# Patient Record
Sex: FEMALE | Race: WHITE | NOT HISPANIC OR LATINO | Employment: UNEMPLOYED | URBAN - METROPOLITAN AREA
[De-identification: names, ages, dates, MRNs, and addresses within clinical notes are randomized per-mention and may not be internally consistent; named-entity substitution may affect disease eponyms.]

---

## 2021-06-07 ENCOUNTER — OFFICE VISIT (OUTPATIENT)
Dept: GYNECOLOGIC ONCOLOGY | Facility: CLINIC | Age: 56
End: 2021-06-07

## 2021-06-07 VITALS
WEIGHT: 127.5 LBS | DIASTOLIC BLOOD PRESSURE: 67 MMHG | BODY MASS INDEX: 21.77 KG/M2 | TEMPERATURE: 96.4 F | SYSTOLIC BLOOD PRESSURE: 147 MMHG | HEIGHT: 64 IN | RESPIRATION RATE: 16 BRPM | HEART RATE: 83 BPM

## 2021-06-07 DIAGNOSIS — K64.9 HEMORRHOIDS, UNSPECIFIED HEMORRHOID TYPE: ICD-10-CM

## 2021-06-07 DIAGNOSIS — R19.00 PELVIC MASS: Primary | ICD-10-CM

## 2021-06-07 PROCEDURE — 99245 OFF/OP CONSLTJ NEW/EST HI 55: CPT | Performed by: OBSTETRICS & GYNECOLOGY

## 2021-06-07 RX ORDER — ACETAMINOPHEN 325 MG/1
975 TABLET ORAL ONCE
Status: CANCELLED | OUTPATIENT
Start: 2021-06-07 | End: 2021-06-07

## 2021-06-07 RX ORDER — HEPARIN SODIUM 5000 [USP'U]/ML
5000 INJECTION, SOLUTION INTRAVENOUS; SUBCUTANEOUS ONCE
Status: CANCELLED | OUTPATIENT
Start: 2021-06-07 | End: 2021-06-07

## 2021-06-07 NOTE — PATIENT INSTRUCTIONS
Abdominal Hysterectomy, Care After  This sheet gives you information about how to care for yourself after your procedure. Your doctor may also give you more specific instructions. If you have problems or questions, contact your doctor.  What can I expect after the procedure?  After the procedure, it is common to have:  · Pain.  · Tiredness.  · No desire to eat.  · Less interest in sex.  · Bleeding and fluid (discharge) from your vagina. You may need to use a pad after this procedure.  Follow these instructions at home:  Medicines  · Take over-the-counter and prescription medicines only as told by your doctor.  · Do not take aspirin or NSAIDs, such as ibuprofen. These medicines can cause bleeding.  · If you were prescribed an antibiotic medicine, take it as told by your doctor. Do not stop using the antibiotic even if you start to feel better.  · Ask your doctor if the medicine prescribed to you:  ? Requires you to avoid driving or using machinery.  ? Can cause trouble pooping (constipation). You may need to take these actions to prevent or treat trouble pooping:  § Take over-the-counter or prescription medicines.  § Eat foods that are high in fiber. These include beans, whole grains, and fresh fruits and vegetables.  § Limit foods that are high in fat and sugar. These include fried or sweet foods.  Surgical cut care    · Follow instructions from your doctor about how to take care of your cut from surgery (incision). Make sure you:  ? Wash your hands with soap and water for at least 20 seconds before and after you change your bandage (dressing). If you cannot use soap and water, use hand .  ? Change your bandage as told by your doctor.  ? Leave stitches (sutures), skin glue, or skin tape (adhesive) strips in place. They may need to stay in place for 2 weeks or longer. If tape strips get loose and curl up, you may trim the loose edges. Do not remove tape strips completely unless your doctor says it is  okay.  · Keep the bandage dry until your doctor says it can be taken off.  · Check your cut from surgery every day for signs of infection. Check for:  ? Redness, swelling, or pain.  ? Fluid or blood.  ? Warmth.  ? Pus or a bad smell.  Activity    · Rest as told by your doctor.  · Do not sit for a long time without moving. Get up to take short walks every 1-2 hours. This is important. Ask for help if you feel weak or unsteady.  · Do not lift anything that is heavier than 10 lb (4.5 kg), or the limit that you are told, until your doctor says that it is safe.  · Follow your doctor's advice about exercise, driving, and general activities. Return to your normal activities as told by your doctor. Ask your doctor what activities are safe for you.  Lifestyle  · Do not douche, use tampons, or have sex for at least 6 weeks or as told by your doctor.  · Do not drink alcohol until your doctor says it is okay.  · Do not use any products that contain nicotine or tobacco, such as cigarettes, e-cigarettes, and chewing tobacco. These can delay healing. If you need help quitting, ask your doctor.  General instructions  · Drink enough fluid to keep your pee (urine) pale yellow.  · Do not take baths, swim, or use a hot tub until your doctor approves. Ask your doctor if you may take showers. You may only be allowed to take sponge baths.  · Try to have someone at home with you for the first 1-2 weeks to help with your daily chores.  · Wear tight-fitting (compression) stockings as told by your doctor.  · Keep all follow-up visits as told by your doctor. This is important.  Contact a doctor if:  · You have chills or a fever.  · You have any of these signs of infection around your cut:  ? Redness, swelling, or pain.  ? Fluid or blood.  ? Warmth.  ? Pus or a bad smell.  · Your cut breaks open.  · You feel dizzy or light-headed.  · You have pain or bleeding when you pee.  · You keep having watery poop (diarrhea).  · You keep feeling like you  may vomit (nauseous) or you keep vomiting.  · You have fluid coming from your vagina that is not normal.  · You have any type of reaction to your medicine that is not normal, like a rash, or you develop an allergy to your medicine.  · Your pain medicine does not help.  Get help right away if:  · You have a fever and your symptoms get worse all of a sudden.  · You have very bad pain in your belly (abdomen).  · You are short of breath.  · You faint.  · You have pain, swelling, or redness of your leg.  · You bleed a lot from your vagina and you see blood clots.  Summary  · It is normal to have some pain, tiredness, and fluid that comes from your vagina.  · Do not take baths, swim, or use a hot tub until your doctor approves. Ask your doctor if you may take showers. You may only be allowed to take sponge baths.  · Do not lift anything that is heavier than 10 lb (4.5 kg), or the limit that you are told, until your doctor says that it is safe.  · Follow your doctor's advice about exercise, driving, and general activities. Ask your doctor what activities are safe for you.  · Try to have someone at home with you for the first 1-2 weeks to help with your daily chores.  This information is not intended to replace advice given to you by your health care provider. Make sure you discuss any questions you have with your health care provider.  Document Revised: 12/15/2020 Document Reviewed: 12/15/2020  [employer] Patient Education © 2021 [employer]

## 2021-06-07 NOTE — PROGRESS NOTES
"     New Patient Office Visit      Patient Name: Maryann Bettencourt  : 1965   MRN: [id number]     Referring Physician: Nemesio Cid MD    Chief Complaint:    Chief Complaint   Patient presents with   • Mass       History of Present Illness: Maryann Bettencourt is a 56 y.o. female who is here today as a consultation for gynecologic oncology.  She reports 6 to 9 months of a feeling of pressure or \"laying on something\" when lying on her stomach to get massages.  She also notes some right lower back pain but she is unsure if this is related.  She has recently lost about 15 pounds, but this was desired and with much effort by cutting out potatoes, bread, and pasta.  She denies any bowel or bladder dysfunction.  She does have some nocturia 2-3 times a night, but this is stable for several years.  She is able to feel the mass through her belly.  She is otherwise well with no past medical history.  She is sexually active and has concerns about impact of hysterectomy on sexual function.  Reports restless leg.    Problem   Pelvic Mass    Referred by Dr. Nemesio Cid    2021: TVUS with uterus is \"severely enlarged \".  There is extensive shadowing within the myometrium.  There is a large solid mass in the right adnexa measuring 13 x 8 x 9 cm with an internal cystic area and a second mass projecting to the left of midline measuring 6 x 6 x 5 cm.  They cannot visualize the endometrium.           Past Medical History:   Past Medical History:   Diagnosis Date   • Pelvic mass        Past Surgical History:   Past Surgical History:   Procedure Laterality Date   • ENDOMETRIAL ABLATION         Family History:   Family History   Problem Relation Age of Onset   • Coronary artery disease Father    • Kidney cancer Father    • Diabetes Father    • Hypertension Father    • Stroke Father    • Lung cancer Mother    • Brain cancer Mother    • Hypertension Sister    • Arthritis Paternal Grandmother    • Breast cancer Neg Hx    • Ovarian cancer " Neg Hx    • Uterine cancer Neg Hx    • Colon cancer Neg Hx    • Melanoma Neg Hx    • Prostate cancer Neg Hx        Social History:   Social History     Socioeconomic History   • Marital status:      Spouse name: Not on file   • Number of children: Not on file   • Years of education: Not on file   • Highest education level: Not on file   Tobacco Use   • Smoking status: Former Smoker     Years: 30.00     Quit date:      Years since quittin.4   • Smokeless tobacco: Never Used   Substance and Sexual Activity   • Alcohol use: Yes   • Drug use: Never   • Sexual activity: Yes     Partners: Male     Birth control/protection: Post-menopausal       Past OB/GYN History:   OB History   No obstetric history on file.        Health Maintenance:   Mammogram: Date: 2021  Colonoscopy: Date:  results: Normal    Medications:     Current Outpatient Medications:   •  Loratadine (CLARITIN PO), Take  by mouth., Disp: , Rfl:     Allergies:   No Known Allergies    Review of Systems:   Review of Systems   Constitutional: Negative for appetite change, chills, fatigue, fever and unexpected weight change.   HENT: Negative for congestion, hearing loss, sneezing, sore throat and tinnitus.    Eyes: Negative for visual disturbance.   Respiratory: Negative for cough, shortness of breath and wheezing.    Cardiovascular: Negative for chest pain, palpitations and leg swelling.   Gastrointestinal: Positive for abdominal distention. Negative for abdominal pain, constipation, diarrhea, nausea and vomiting.   Genitourinary: Positive for frequency (At night). Negative for dyspareunia, dysuria, hematuria, pelvic pain, urgency and vaginal bleeding.   Musculoskeletal: Negative for arthralgias, back pain and myalgias.   Skin: Negative for color change, pallor and rash.   Neurological: Negative for dizziness, light-headedness, numbness and headaches.   Hematological: Negative for adenopathy. Does not bruise/bleed easily.  "  Psychiatric/Behavioral: Negative for dysphoric mood. The patient is not nervous/anxious.         Objective     Physical Exam:  Vital Signs:   Vitals:    06/07/21 1339   BP: 147/67   Pulse: 83   Resp: 16   Temp: 96.4 °F (35.8 °C)   TempSrc: Temporal   Weight: 57.8 kg (127 lb 8 oz)   Height: 162.6 cm (64\")   PainSc: 0-No pain     BMI: Body mass index is 21.89 kg/m².   ECOG PS: 0              PHQ-2 Depression Screening  Little interest or pleasure in doing things? 0   Feeling down, depressed, or hopeless? 0   PHQ-2 Total Score 0     Physical Exam  Vitals and nursing note reviewed. Exam conducted with a chaperone present.   Constitutional:       General: She is not in acute distress.     Appearance: Normal appearance. She is normal weight. She is not ill-appearing or toxic-appearing.   HENT:      Head: Normocephalic.      Nose: Nose normal.      Mouth/Throat:      Mouth: Mucous membranes are dry.   Eyes:      Extraocular Movements: Extraocular movements intact.      Pupils: Pupils are equal, round, and reactive to light.   Cardiovascular:      Rate and Rhythm: Normal rate and regular rhythm.      Heart sounds: No murmur heard.   No friction rub. No gallop.    Pulmonary:      Effort: Pulmonary effort is normal. No respiratory distress.      Breath sounds: Normal breath sounds. No wheezing, rhonchi or rales.   Abdominal:      General: Abdomen is flat. There is no distension.      Palpations: There is mass (There is a firm, mobile right-sided abdominal mass to the level of the umbilicus).      Tenderness: There is no abdominal tenderness. There is no guarding or rebound.   Genitourinary:     Comments: External genitalia normal.  Vagina without discharge.  Cervix is deviated to patient's left.  Irregularly shaped mass approximately 20 weeks in size.  Difficult to distinguish the adnexa from the uterus.  Rectovaginal exam deferred  Musculoskeletal:         General: Normal range of motion.      Cervical back: Normal range " of motion.   Skin:     General: Skin is warm and dry.   Neurological:      General: No focal deficit present.      Mental Status: She is alert and oriented to person, place, and time. Mental status is at baseline.      Cranial Nerves: No cranial nerve deficit.      Motor: No weakness.   Psychiatric:         Mood and Affect: Mood normal.         Behavior: Behavior normal.         Thought Content: Thought content normal.         Judgment: Judgment normal.         Assessment / Plan    Maryann Bettencourt is a 56 y.o. who presents two large symptomatic solid pelvic masses. Images visualized by me and demonstrate findings most consistent with uterine fibroids..  Unfortunately there is no way to determine whether or not this is malignant or benign without surgical intervention (though I favor benign). I recommend proceeding with total abdominal hysterectomy, bilateral salpingo-oophorectomy via abdominal approach. We discussed the risks of surgery including bleeding, infection, wound breakdown, blood clots, injury to surrounding organs including bowel, bladder, ureters, blood vessels and nerves requiring additional intervention. She verbally consented and will be scheduled for surgery in the near future.  We did review her recovery next dictations for surgery including anticipated hospital stay.  We discussed multimodality pain control with the intention to avoid narcotics.  Patient has a history of constipation with hemorrhoids be sure that we keep her on a good bowel regimen.    Pain assessment was performed today as a part of patient’s care.  For patients with pain related to surgery, gynecologic malignancy or cancer treatment, the plan is as noted in the assessment/plan.  For patients with pain not related to these issues, they are to seek any further needed care from a more appropriate provider, such as PCP.      Depression assessment was performed as indicated. For patients with prior diagnoses of anxiety/depression and  currently on medications, they were encouraged to seek any further needed care from their PCP or mental health professional. For those patients without a prior diagnosis and concern for depression, the plan is as noted in the assessment/plan.     Diagnoses and all orders for this visit:    1. Pelvic mass (Primary)  -     Case Request; Standing  -     CBC & Differential; Future  -     Comprehensive Metabolic Panel; Future  -     Type & Screen; Future  -     ECG 12 Lead; Future  -     Case Request  -     Liquid-based Pap Smear, Diagnostic; Future    Other orders  -     Verify NPO Status; Future  -     Obtain Informed Consent; Future  -     Provide Instructions to Patient on NPO Status; Future  -     Chlorhexidine Skin Prep; Future  -     Instruct Patient on Coughing, Deep Breathing & Incentive Spirometry; Future  -     Verify NPO Status; Standing  -     Obtain Informed Consent If Not Already Obtained; Standing  -     Notify Physician - Standard; Standing  -     Initiate Observation Status; Standing  -     acetaminophen (TYLENOL) tablet 975 mg  -     ceFAZolin (ANCEF) 2 g in sodium chloride 0.9 % 100 mL IVPB  -     heparin (porcine) 5000 UNIT/ML injection 5,000 Units       Follow Up: For postop visit following surgery    Pricilla Santiago MD  Gynecologic Oncology     Patient was seen and examined with Dr. Santiago,  resident, who performed portions of the examination and documentation for this patient's care under my direct supervision.  I agree with the above documentation and plan.    MDM  Number of Diagnoses or Management Options  Hemorrhoids, unspecified hemorrhoid type: minor  Pelvic mass: new, needed workup     Amount and/or Complexity of Data Reviewed  Clinical lab tests: ordered (CBC, CMP, T&S)  Tests in the radiology section of CPT®: reviewed  Tests in the medicine section of CPT®: ordered (EKG)  Discussion of test results with the performing providers: no  Decide to obtain previous medical records or to obtain  history from someone other than the patient: no  Obtain history from someone other than the patient: no  Review and summarize past medical records: yes  Discuss the patient with other providers: no  Independent visualization of images, tracings, or specimens: yes (Visualized images from TVUS)    Risk of Complications, Morbidity, and/or Mortality  Presenting problems: moderate  Diagnostic procedures: high  Management options: high RASHID Escalera MD  Gynecologic Oncology     Please note that portions of this note may have been completed with a voice recognition program.

## 2021-06-11 ENCOUNTER — TELEPHONE (OUTPATIENT)
Dept: GYNECOLOGIC ONCOLOGY | Facility: CLINIC | Age: 56
End: 2021-06-11

## 2021-06-11 ENCOUNTER — PATIENT EDUCATION (SURGERY INSTRUCTIONS) (OUTPATIENT)
Dept: GYNECOLOGIC ONCOLOGY | Facility: CLINIC | Age: 56
End: 2021-06-11

## 2021-06-11 NOTE — TELEPHONE ENCOUNTER
Caller: ARNALDO     Relationship: SELF     Best call back number [phone]    PATIENT WOULD LIKE SOMEONE FROM SCHEDULING TO CLARIFY HER APPTS FOR HER SHE HASN'T RECEIVED ANY INFORMATION AND IS CONCERNED   6-29 PRE OP   7-1 OP  PLEASE CALL AND ADVISE   THANK YOU

## 2021-06-11 NOTE — PATIENT INSTRUCTIONS
Laparoscopic Surgery Instructions            Maryann Bettencourt  [id number]  1965      SURGEON:  Rox Escalera MD    Surgery Coordinator: Leonela   If you have any questions before or after surgery please call.  [phone]       Appointment      2. You have pre-admission testing (PAT) appointment on 07/02/2021  at 10:30 am You will need to be at hospital registration 10 minutes before that time. The registration department is located in the long hallway between the [facility] and [address] [place] [address]. This is on the first floor of the [facility] hospital you can enter through the [address] [place] [address].      3.  Your surgery has been scheduled on 07/06/2021.  You will need to be at the [address] [place] [address] second floor surgery registration on that day at 05:30 am    The Day(s) Before Surgery     ·  Nothing by mouth after midnight on 07/05/2021    · Plan to have someone take you home from the hospital.    · Do not use any products that contain nicotine or tobacco, such as cigarettes and e-cigarettes. These can delay healing after surgery. If you need help quitting, ask your health care provider.    ·  Do not take vitamins or aspirin one week before surgery ( if applicable).  On the morning of your surgery, you may take you prescription medications with a sip of water, unless told otherwise by your provider.  Bring them with you to the hospital (Diabetic patient should bring insulin if instructed by the managing physician). If you are taking a blood thinner ( Eliquis, Coumadin, Xarelto, Lovenox, Asprin, Heparin, etc.) please have the provider that manages this instruct you on when to stop taking prior to surgery.     · If you are feeling sick, have a fever or cough and have seen your PCP let our office know 48 hours prior to surgery. It may be subject to rescheduling.            What can I expect after the procedure?    A normal length of stay for laparoscopic procedures can be same day or up to 23 hours.     After the procedure, it is  common to have:  · Pain.  · Soreness and numbness in your incision areas.  · Vaginal bleeding and discharge up to 6 weeks after surgery.  · Constipation.  · Temporary change in bladder function.  · Feelings of sadness or other emotions.  · Small amounts of clear drainage from incisions  · If you are discharged with an abdominal binder, this is to be used as needed for incisional comfort. You may choose to discontinue use at any time.      Follow these instructions at home:  Medicines    · Please take any medications that have been prescribed after your surgery, you may take over the counter Tylenol and Ibuprofen, unless told otherwise by your provider.   · Please take your stool softener as prescribed. If you do not have a bowel movement within 24 hours following surgery try a laxative (milk of magnesia) or you may take Cammie-Lax.    Activity    · Return to your normal activities as told by your health care provider.   · You may be told to take short walks every day and go farther each time.  · Do not lift anything that is heavier than 20 lbs.      General instructions    · Do not put anything in your vagina for 6 weeks after your surgery or as told by your health care provider. This includes tampons and douches.  · Do not have sex until your health care provider says you can.  · Do not take baths, swim, or use a hot tub until your health care provider approves.  · Drink enough fluid to keep your urine clear or pale yellow.  · Do not drive for 24 hours if you were given a sedative.  · Do not drive while taking Narcotic Pain medication ( oxycodone, hydrocodone, etc.).   · Keep all follow-up visits as told by your health care provider. This is important. You will have a post op appointment typically 3 weeks after surgery.  · Make sure you are urinating on a scheduled basis, for example every 2 hours. This will help retrain your bladder after surgery and prevent urinary tract infections.     Contact a health care  provider if:    · Your pain medicine is not helping.  · You have a fever over 101.0  · You have redness, swelling, or pain at your incision site.  · You continue to have difficulty urinating.  · You have not had a bowel movement 2-3 days after surgery, experience nausea and vomiting, are unable to pass gas.   · Large volumes of drainage or blood from incisions, requiring multiple guaze changes.     Get help right away if:    · You have severe abdominal or back pain that is not controlled with medication.  · You have heavy bleeding from your vagina that saturates a maxi pad within 1-2 hours. Note- vaginal bleeding and spotting is normal up to 6 weeks from a hysterectomy, Heavy Bleeding is not.     If you experience a medical emergency call 911 or have someone drive you to your nearest emergency department.

## 2021-06-29 ENCOUNTER — APPOINTMENT (OUTPATIENT)
Dept: PREADMISSION TESTING | Facility: HOSPITAL | Age: 56
End: 2021-06-29

## 2021-07-02 ENCOUNTER — PRE-ADMISSION TESTING (OUTPATIENT)
Dept: PREADMISSION TESTING | Facility: HOSPITAL | Age: 56
End: 2021-07-02

## 2021-07-02 VITALS — BODY MASS INDEX: 23 KG/M2 | WEIGHT: 125 LBS | HEIGHT: 62 IN

## 2021-07-02 DIAGNOSIS — R19.00 PELVIC MASS: ICD-10-CM

## 2021-07-02 DIAGNOSIS — Z01.89 LABORATORY TEST: Primary | ICD-10-CM

## 2021-07-02 LAB
ABO GROUP BLD: NORMAL
ALBUMIN SERPL-MCNC: 4.4 G/DL (ref 3.5–5.2)
ALBUMIN/GLOB SERPL: 1.8 G/DL
ALP SERPL-CCNC: 87 U/L (ref 39–117)
ALT SERPL W P-5'-P-CCNC: 11 U/L (ref 1–33)
ANION GAP SERPL CALCULATED.3IONS-SCNC: 9 MMOL/L (ref 5–15)
AST SERPL-CCNC: 17 U/L (ref 1–32)
BASOPHILS # BLD AUTO: 0.07 10*3/MM3 (ref 0–0.2)
BASOPHILS NFR BLD AUTO: 0.9 % (ref 0–1.5)
BILIRUB SERPL-MCNC: 0.3 MG/DL (ref 0–1.2)
BUN SERPL-MCNC: 22 MG/DL (ref 6–20)
BUN/CREAT SERPL: 29.3 (ref 7–25)
CALCIUM SPEC-SCNC: 9.5 MG/DL (ref 8.6–10.5)
CHLORIDE SERPL-SCNC: 107 MMOL/L (ref 98–107)
CO2 SERPL-SCNC: 28 MMOL/L (ref 22–29)
CREAT SERPL-MCNC: 0.75 MG/DL (ref 0.57–1)
DEPRECATED RDW RBC AUTO: 47.7 FL (ref 37–54)
EOSINOPHIL # BLD AUTO: 0.14 10*3/MM3 (ref 0–0.4)
EOSINOPHIL NFR BLD AUTO: 1.8 % (ref 0.3–6.2)
ERYTHROCYTE [DISTWIDTH] IN BLOOD BY AUTOMATED COUNT: 14.2 % (ref 12.3–15.4)
GFR SERPL CREATININE-BSD FRML MDRD: 79 ML/MIN/1.73
GLOBULIN UR ELPH-MCNC: 2.5 GM/DL
GLUCOSE SERPL-MCNC: 98 MG/DL (ref 65–99)
HBA1C MFR BLD: 5.3 % (ref 4.8–5.6)
HCT VFR BLD AUTO: 42.8 % (ref 34–46.6)
HGB BLD-MCNC: 13.5 G/DL (ref 12–15.9)
IMM GRANULOCYTES # BLD AUTO: 0.02 10*3/MM3 (ref 0–0.05)
IMM GRANULOCYTES NFR BLD AUTO: 0.3 % (ref 0–0.5)
LYMPHOCYTES # BLD AUTO: 1.83 10*3/MM3 (ref 0.7–3.1)
LYMPHOCYTES NFR BLD AUTO: 23.5 % (ref 19.6–45.3)
MCH RBC QN AUTO: 28.9 PG (ref 26.6–33)
MCHC RBC AUTO-ENTMCNC: 31.5 G/DL (ref 31.5–35.7)
MCV RBC AUTO: 91.6 FL (ref 79–97)
MONOCYTES # BLD AUTO: 0.35 10*3/MM3 (ref 0.1–0.9)
MONOCYTES NFR BLD AUTO: 4.5 % (ref 5–12)
NEUTROPHILS NFR BLD AUTO: 5.39 10*3/MM3 (ref 1.7–7)
NEUTROPHILS NFR BLD AUTO: 69 % (ref 42.7–76)
NRBC BLD AUTO-RTO: 0 /100 WBC (ref 0–0.2)
PLATELET # BLD AUTO: 294 10*3/MM3 (ref 140–450)
PMV BLD AUTO: 12.1 FL (ref 6–12)
POTASSIUM SERPL-SCNC: 4 MMOL/L (ref 3.5–5.2)
PROT SERPL-MCNC: 6.9 G/DL (ref 6–8.5)
QT INTERVAL: 382 MS
QTC INTERVAL: 415 MS
RBC # BLD AUTO: 4.67 10*6/MM3 (ref 3.77–5.28)
RH BLD: NEGATIVE
SODIUM SERPL-SCNC: 144 MMOL/L (ref 136–145)
WBC # BLD AUTO: 7.8 10*3/MM3 (ref 3.4–10.8)

## 2021-07-02 PROCEDURE — 86901 BLOOD TYPING SEROLOGIC RH(D): CPT

## 2021-07-02 PROCEDURE — 36415 COLL VENOUS BLD VENIPUNCTURE: CPT

## 2021-07-02 PROCEDURE — 80053 COMPREHEN METABOLIC PANEL: CPT

## 2021-07-02 PROCEDURE — 86900 BLOOD TYPING SEROLOGIC ABO: CPT

## 2021-07-02 PROCEDURE — 85025 COMPLETE CBC W/AUTO DIFF WBC: CPT

## 2021-07-02 PROCEDURE — 83036 HEMOGLOBIN GLYCOSYLATED A1C: CPT

## 2021-07-02 PROCEDURE — 93010 ELECTROCARDIOGRAM REPORT: CPT | Performed by: INTERNAL MEDICINE

## 2021-07-02 PROCEDURE — 93005 ELECTROCARDIOGRAM TRACING: CPT

## 2021-07-05 ENCOUNTER — ANESTHESIA EVENT (OUTPATIENT)
Dept: PERIOP | Facility: HOSPITAL | Age: 56
End: 2021-07-05

## 2021-07-05 ENCOUNTER — PRE-ADMISSION TESTING (OUTPATIENT)
Dept: PREADMISSION TESTING | Facility: HOSPITAL | Age: 56
End: 2021-07-05

## 2021-07-05 LAB — SARS-COV-2 RNA RESP QL NAA+PROBE: NOT DETECTED

## 2021-07-05 PROCEDURE — C9803 HOPD COVID-19 SPEC COLLECT: HCPCS

## 2021-07-05 PROCEDURE — U0003 INFECTIOUS AGENT DETECTION BY NUCLEIC ACID (DNA OR RNA); SEVERE ACUTE RESPIRATORY SYNDROME CORONAVIRUS 2 (SARS-COV-2) (CORONAVIRUS DISEASE [COVID-19]), AMPLIFIED PROBE TECHNIQUE, MAKING USE OF HIGH THROUGHPUT TECHNOLOGIES AS DESCRIBED BY CMS-2020-01-R: HCPCS

## 2021-07-06 ENCOUNTER — HOSPITAL ENCOUNTER (INPATIENT)
Facility: HOSPITAL | Age: 56
LOS: 1 days | Discharge: HOME | End: 2021-07-07
Attending: OBSTETRICS & GYNECOLOGY | Admitting: OBSTETRICS & GYNECOLOGY

## 2021-07-06 ENCOUNTER — ANESTHESIA EVENT CONVERTED (OUTPATIENT)
Dept: ANESTHESIOLOGY | Facility: HOSPITAL | Age: 56
End: 2021-07-06

## 2021-07-06 ENCOUNTER — ANESTHESIA (OUTPATIENT)
Dept: PERIOP | Facility: HOSPITAL | Age: 56
End: 2021-07-06

## 2021-07-06 DIAGNOSIS — R19.00 PELVIC MASS: ICD-10-CM

## 2021-07-06 LAB
ABO GROUP BLD: NORMAL
BLD GP AB SCN SERPL QL: NEGATIVE
RH BLD: NEGATIVE
T&S EXPIRATION DATE: NORMAL

## 2021-07-06 PROCEDURE — 88342 IMHCHEM/IMCYTCHM 1ST ANTB: CPT | Performed by: OBSTETRICS & GYNECOLOGY

## 2021-07-06 PROCEDURE — 25010000002 FENTANYL CITRATE (PF) 50 MCG/ML SOLUTION: Performed by: NURSE ANESTHETIST, CERTIFIED REGISTERED

## 2021-07-06 PROCEDURE — S0260 H&P FOR SURGERY: HCPCS | Performed by: OBSTETRICS & GYNECOLOGY

## 2021-07-06 PROCEDURE — 25010000002 MIDAZOLAM PER 1 MG: Performed by: NURSE ANESTHETIST, CERTIFIED REGISTERED

## 2021-07-06 PROCEDURE — 25010000002 PROPOFOL 10 MG/ML EMULSION: Performed by: NURSE ANESTHETIST, CERTIFIED REGISTERED

## 2021-07-06 PROCEDURE — 25010000003 CEFAZOLIN IN DEXTROSE 2-4 GM/100ML-% SOLUTION: Performed by: OBSTETRICS & GYNECOLOGY

## 2021-07-06 PROCEDURE — 25010000002 HEPARIN (PORCINE) PER 1000 UNITS: Performed by: OBSTETRICS & GYNECOLOGY

## 2021-07-06 PROCEDURE — 25010000002 KETOROLAC TROMETHAMINE PER 15 MG: Performed by: OBSTETRICS & GYNECOLOGY

## 2021-07-06 PROCEDURE — 86901 BLOOD TYPING SEROLOGIC RH(D): CPT | Performed by: OBSTETRICS & GYNECOLOGY

## 2021-07-06 PROCEDURE — 25010000002 DEXAMETHASONE SODIUM PHOSPHATE 10 MG/ML SOLUTION: Performed by: NURSE ANESTHETIST, CERTIFIED REGISTERED

## 2021-07-06 PROCEDURE — 25010000002 HYDROMORPHONE PER 4 MG: Performed by: NURSE ANESTHETIST, CERTIFIED REGISTERED

## 2021-07-06 PROCEDURE — 0UT70ZZ RESECTION OF BILATERAL FALLOPIAN TUBES, OPEN APPROACH: ICD-10-PCS | Performed by: OBSTETRICS & GYNECOLOGY

## 2021-07-06 PROCEDURE — 88309 TISSUE EXAM BY PATHOLOGIST: CPT | Performed by: OBSTETRICS & GYNECOLOGY

## 2021-07-06 PROCEDURE — 25010000002 DEXAMETHASONE PER 1 MG: Performed by: NURSE ANESTHETIST, CERTIFIED REGISTERED

## 2021-07-06 PROCEDURE — 25010000002 ONDANSETRON PER 1 MG: Performed by: OBSTETRICS & GYNECOLOGY

## 2021-07-06 PROCEDURE — 88341 IMHCHEM/IMCYTCHM EA ADD ANTB: CPT | Performed by: OBSTETRICS & GYNECOLOGY

## 2021-07-06 PROCEDURE — 25010000002 NEOSTIGMINE 10 MG/10ML SOLUTION: Performed by: NURSE ANESTHETIST, CERTIFIED REGISTERED

## 2021-07-06 PROCEDURE — 0UT20ZZ RESECTION OF BILATERAL OVARIES, OPEN APPROACH: ICD-10-PCS | Performed by: OBSTETRICS & GYNECOLOGY

## 2021-07-06 PROCEDURE — 58150 TOTAL HYSTERECTOMY: CPT | Performed by: PHYSICIAN ASSISTANT

## 2021-07-06 PROCEDURE — 0UT90ZZ RESECTION OF UTERUS, OPEN APPROACH: ICD-10-PCS | Performed by: OBSTETRICS & GYNECOLOGY

## 2021-07-06 PROCEDURE — 25010000002 KETOROLAC TROMETHAMINE PER 15 MG: Performed by: NURSE ANESTHETIST, CERTIFIED REGISTERED

## 2021-07-06 PROCEDURE — 25010000002 ONDANSETRON PER 1 MG: Performed by: NURSE ANESTHETIST, CERTIFIED REGISTERED

## 2021-07-06 PROCEDURE — 88331 PATH CONSLTJ SURG 1 BLK 1SPC: CPT | Performed by: OBSTETRICS & GYNECOLOGY

## 2021-07-06 PROCEDURE — 94799 UNLISTED PULMONARY SVC/PX: CPT

## 2021-07-06 PROCEDURE — 58150 TOTAL HYSTERECTOMY: CPT | Performed by: OBSTETRICS & GYNECOLOGY

## 2021-07-06 PROCEDURE — 88305 TISSUE EXAM BY PATHOLOGIST: CPT | Performed by: OBSTETRICS & GYNECOLOGY

## 2021-07-06 PROCEDURE — C1889 IMPLANT/INSERT DEVICE, NOC: HCPCS | Performed by: OBSTETRICS & GYNECOLOGY

## 2021-07-06 PROCEDURE — 86850 RBC ANTIBODY SCREEN: CPT | Performed by: OBSTETRICS & GYNECOLOGY

## 2021-07-06 PROCEDURE — 86900 BLOOD TYPING SEROLOGIC ABO: CPT | Performed by: OBSTETRICS & GYNECOLOGY

## 2021-07-06 DEVICE — HEMOST ABS SURGICEL PWDR 3GM: Type: IMPLANTABLE DEVICE | Site: ABDOMEN | Status: FUNCTIONAL

## 2021-07-06 RX ORDER — DEXAMETHASONE SODIUM PHOSPHATE 4 MG/ML
INJECTION, SOLUTION INTRA-ARTICULAR; INTRALESIONAL; INTRAMUSCULAR; INTRAVENOUS; SOFT TISSUE AS NEEDED
Status: DISCONTINUED | OUTPATIENT
Start: 2021-07-06 | End: 2021-07-06 | Stop reason: SURG

## 2021-07-06 RX ORDER — LIDOCAINE HYDROCHLORIDE 10 MG/ML
0.5 INJECTION, SOLUTION EPIDURAL; INFILTRATION; INTRACAUDAL; PERINEURAL ONCE AS NEEDED
Status: COMPLETED | OUTPATIENT
Start: 2021-07-06 | End: 2021-07-06

## 2021-07-06 RX ORDER — SODIUM CHLORIDE 0.9 % (FLUSH) 0.9 %
3-10 SYRINGE (ML) INJECTION AS NEEDED
Status: DISCONTINUED | OUTPATIENT
Start: 2021-07-06 | End: 2021-07-06 | Stop reason: HOSPADM

## 2021-07-06 RX ORDER — NEOSTIGMINE METHYLSULFATE 1 MG/ML
INJECTION, SOLUTION INTRAVENOUS AS NEEDED
Status: DISCONTINUED | OUTPATIENT
Start: 2021-07-06 | End: 2021-07-06 | Stop reason: SURG

## 2021-07-06 RX ORDER — HYDRALAZINE HYDROCHLORIDE 20 MG/ML
5 INJECTION INTRAMUSCULAR; INTRAVENOUS
Status: DISCONTINUED | OUTPATIENT
Start: 2021-07-06 | End: 2021-07-06 | Stop reason: HOSPADM

## 2021-07-06 RX ORDER — PROMETHAZINE HYDROCHLORIDE 25 MG/1
25 SUPPOSITORY RECTAL ONCE AS NEEDED
Status: DISCONTINUED | OUTPATIENT
Start: 2021-07-06 | End: 2021-07-06 | Stop reason: HOSPADM

## 2021-07-06 RX ORDER — HEPARIN SODIUM 5000 [USP'U]/ML
5000 INJECTION, SOLUTION INTRAVENOUS; SUBCUTANEOUS ONCE
Status: COMPLETED | OUTPATIENT
Start: 2021-07-06 | End: 2021-07-06

## 2021-07-06 RX ORDER — HEPARIN SODIUM 5000 [USP'U]/ML
5000 INJECTION, SOLUTION INTRAVENOUS; SUBCUTANEOUS EVERY 8 HOURS SCHEDULED
Status: DISCONTINUED | OUTPATIENT
Start: 2021-07-07 | End: 2021-07-07 | Stop reason: HOSPADM

## 2021-07-06 RX ORDER — ROCURONIUM BROMIDE 10 MG/ML
INJECTION, SOLUTION INTRAVENOUS AS NEEDED
Status: DISCONTINUED | OUTPATIENT
Start: 2021-07-06 | End: 2021-07-06 | Stop reason: SURG

## 2021-07-06 RX ORDER — MAGNESIUM HYDROXIDE 1200 MG/15ML
LIQUID ORAL AS NEEDED
Status: DISCONTINUED | OUTPATIENT
Start: 2021-07-06 | End: 2021-07-06 | Stop reason: HOSPADM

## 2021-07-06 RX ORDER — PROMETHAZINE HYDROCHLORIDE 25 MG/1
25 TABLET ORAL ONCE AS NEEDED
Status: DISCONTINUED | OUTPATIENT
Start: 2021-07-06 | End: 2021-07-06 | Stop reason: HOSPADM

## 2021-07-06 RX ORDER — KETOROLAC TROMETHAMINE 15 MG/ML
15 INJECTION, SOLUTION INTRAMUSCULAR; INTRAVENOUS EVERY 6 HOURS
Status: COMPLETED | OUTPATIENT
Start: 2021-07-06 | End: 2021-07-07

## 2021-07-06 RX ORDER — CEFAZOLIN SODIUM 2 G/100ML
2 INJECTION, SOLUTION INTRAVENOUS ONCE
Status: COMPLETED | OUTPATIENT
Start: 2021-07-06 | End: 2021-07-06

## 2021-07-06 RX ORDER — KETOROLAC TROMETHAMINE 30 MG/ML
INJECTION, SOLUTION INTRAMUSCULAR; INTRAVENOUS AS NEEDED
Status: DISCONTINUED | OUTPATIENT
Start: 2021-07-06 | End: 2021-07-06 | Stop reason: SURG

## 2021-07-06 RX ORDER — BUPIVACAINE HCL/0.9 % NACL/PF 0.125 %
PLASTIC BAG, INJECTION (ML) EPIDURAL AS NEEDED
Status: DISCONTINUED | OUTPATIENT
Start: 2021-07-06 | End: 2021-07-06

## 2021-07-06 RX ORDER — EPHEDRINE SULFATE 50 MG/ML
INJECTION, SOLUTION INTRAVENOUS AS NEEDED
Status: DISCONTINUED | OUTPATIENT
Start: 2021-07-06 | End: 2021-07-06 | Stop reason: SURG

## 2021-07-06 RX ORDER — HYDROCODONE BITARTRATE AND ACETAMINOPHEN 5; 325 MG/1; MG/1
1 TABLET ORAL ONCE AS NEEDED
Status: DISCONTINUED | OUTPATIENT
Start: 2021-07-06 | End: 2021-07-06 | Stop reason: HOSPADM

## 2021-07-06 RX ORDER — OXYCODONE HYDROCHLORIDE 5 MG/1
5 TABLET ORAL EVERY 4 HOURS PRN
Status: DISCONTINUED | OUTPATIENT
Start: 2021-07-06 | End: 2021-07-07 | Stop reason: HOSPADM

## 2021-07-06 RX ORDER — ONDANSETRON 2 MG/ML
4 INJECTION INTRAMUSCULAR; INTRAVENOUS EVERY 6 HOURS PRN
Status: DISCONTINUED | OUTPATIENT
Start: 2021-07-06 | End: 2021-07-07 | Stop reason: HOSPADM

## 2021-07-06 RX ORDER — SODIUM CHLORIDE, SODIUM LACTATE, POTASSIUM CHLORIDE, CALCIUM CHLORIDE 600; 310; 30; 20 MG/100ML; MG/100ML; MG/100ML; MG/100ML
75 INJECTION, SOLUTION INTRAVENOUS CONTINUOUS
Status: DISCONTINUED | OUTPATIENT
Start: 2021-07-06 | End: 2021-07-07 | Stop reason: HOSPADM

## 2021-07-06 RX ORDER — SODIUM CHLORIDE 0.9 % (FLUSH) 0.9 %
10 SYRINGE (ML) INJECTION EVERY 12 HOURS SCHEDULED
Status: DISCONTINUED | OUTPATIENT
Start: 2021-07-06 | End: 2021-07-06 | Stop reason: HOSPADM

## 2021-07-06 RX ORDER — FAMOTIDINE 10 MG/ML
20 INJECTION, SOLUTION INTRAVENOUS ONCE
Status: DISCONTINUED | OUTPATIENT
Start: 2021-07-06 | End: 2021-07-06 | Stop reason: HOSPADM

## 2021-07-06 RX ORDER — OXYCODONE HYDROCHLORIDE 5 MG/1
10 TABLET ORAL EVERY 4 HOURS PRN
Status: DISCONTINUED | OUTPATIENT
Start: 2021-07-06 | End: 2021-07-07 | Stop reason: HOSPADM

## 2021-07-06 RX ORDER — ACETAMINOPHEN 325 MG/1
650 TABLET ORAL EVERY 4 HOURS
Status: DISCONTINUED | OUTPATIENT
Start: 2021-07-06 | End: 2021-07-07 | Stop reason: HOSPADM

## 2021-07-06 RX ORDER — IPRATROPIUM BROMIDE AND ALBUTEROL SULFATE 2.5; .5 MG/3ML; MG/3ML
3 SOLUTION RESPIRATORY (INHALATION) ONCE AS NEEDED
Status: DISCONTINUED | OUTPATIENT
Start: 2021-07-06 | End: 2021-07-06 | Stop reason: HOSPADM

## 2021-07-06 RX ORDER — ONDANSETRON 2 MG/ML
4 INJECTION INTRAMUSCULAR; INTRAVENOUS ONCE AS NEEDED
Status: DISCONTINUED | OUTPATIENT
Start: 2021-07-06 | End: 2021-07-06 | Stop reason: HOSPADM

## 2021-07-06 RX ORDER — GABAPENTIN 100 MG/1
100 CAPSULE ORAL EVERY 8 HOURS SCHEDULED
Status: DISCONTINUED | OUTPATIENT
Start: 2021-07-06 | End: 2021-07-07 | Stop reason: HOSPADM

## 2021-07-06 RX ORDER — DEXAMETHASONE SODIUM PHOSPHATE 10 MG/ML
INJECTION, SOLUTION INTRAMUSCULAR; INTRAVENOUS
Status: COMPLETED | OUTPATIENT
Start: 2021-07-06 | End: 2021-07-06

## 2021-07-06 RX ORDER — GLYCOPYRROLATE 0.2 MG/ML
INJECTION INTRAMUSCULAR; INTRAVENOUS AS NEEDED
Status: DISCONTINUED | OUTPATIENT
Start: 2021-07-06 | End: 2021-07-06 | Stop reason: SURG

## 2021-07-06 RX ORDER — LABETALOL HYDROCHLORIDE 5 MG/ML
5 INJECTION, SOLUTION INTRAVENOUS
Status: DISCONTINUED | OUTPATIENT
Start: 2021-07-06 | End: 2021-07-06 | Stop reason: HOSPADM

## 2021-07-06 RX ORDER — ACETAMINOPHEN 325 MG/1
975 TABLET ORAL ONCE
Status: COMPLETED | OUTPATIENT
Start: 2021-07-06 | End: 2021-07-06

## 2021-07-06 RX ORDER — NAPROXEN SODIUM 220 MG
220 TABLET ORAL 2 TIMES DAILY PRN
COMMUNITY
End: 2021-07-07 | Stop reason: HOSPADM

## 2021-07-06 RX ORDER — HYDROMORPHONE HYDROCHLORIDE 1 MG/ML
0.5 INJECTION, SOLUTION INTRAMUSCULAR; INTRAVENOUS; SUBCUTANEOUS
Status: DISCONTINUED | OUTPATIENT
Start: 2021-07-06 | End: 2021-07-06 | Stop reason: HOSPADM

## 2021-07-06 RX ORDER — MIDAZOLAM HYDROCHLORIDE 1 MG/ML
INJECTION INTRAMUSCULAR; INTRAVENOUS AS NEEDED
Status: DISCONTINUED | OUTPATIENT
Start: 2021-07-06 | End: 2021-07-06 | Stop reason: SURG

## 2021-07-06 RX ORDER — SODIUM CHLORIDE 0.9 % (FLUSH) 0.9 %
3 SYRINGE (ML) INJECTION EVERY 12 HOURS SCHEDULED
Status: DISCONTINUED | OUTPATIENT
Start: 2021-07-06 | End: 2021-07-06 | Stop reason: HOSPADM

## 2021-07-06 RX ORDER — SODIUM CHLORIDE 0.9 % (FLUSH) 0.9 %
10 SYRINGE (ML) INJECTION AS NEEDED
Status: DISCONTINUED | OUTPATIENT
Start: 2021-07-06 | End: 2021-07-06 | Stop reason: HOSPADM

## 2021-07-06 RX ORDER — DOCUSATE SODIUM 100 MG/1
100 CAPSULE, LIQUID FILLED ORAL 2 TIMES DAILY
Status: DISCONTINUED | OUTPATIENT
Start: 2021-07-06 | End: 2021-07-07 | Stop reason: HOSPADM

## 2021-07-06 RX ORDER — MIDAZOLAM HYDROCHLORIDE 1 MG/ML
1 INJECTION INTRAMUSCULAR; INTRAVENOUS
Status: DISCONTINUED | OUTPATIENT
Start: 2021-07-06 | End: 2021-07-06 | Stop reason: HOSPADM

## 2021-07-06 RX ORDER — PROMETHAZINE HYDROCHLORIDE 12.5 MG/1
12.5 TABLET ORAL EVERY 6 HOURS PRN
Status: DISCONTINUED | OUTPATIENT
Start: 2021-07-06 | End: 2021-07-07 | Stop reason: HOSPADM

## 2021-07-06 RX ORDER — PROMETHAZINE HYDROCHLORIDE 12.5 MG/1
12.5 SUPPOSITORY RECTAL EVERY 6 HOURS PRN
Status: DISCONTINUED | OUTPATIENT
Start: 2021-07-06 | End: 2021-07-07 | Stop reason: HOSPADM

## 2021-07-06 RX ORDER — LIDOCAINE HYDROCHLORIDE 10 MG/ML
INJECTION, SOLUTION EPIDURAL; INFILTRATION; INTRACAUDAL; PERINEURAL AS NEEDED
Status: DISCONTINUED | OUTPATIENT
Start: 2021-07-06 | End: 2021-07-06 | Stop reason: SURG

## 2021-07-06 RX ORDER — PROPOFOL 10 MG/ML
VIAL (ML) INTRAVENOUS AS NEEDED
Status: DISCONTINUED | OUTPATIENT
Start: 2021-07-06 | End: 2021-07-06 | Stop reason: SURG

## 2021-07-06 RX ORDER — BUPIVACAINE HYDROCHLORIDE 2.5 MG/ML
INJECTION, SOLUTION EPIDURAL; INFILTRATION; INTRACAUDAL
Status: COMPLETED | OUTPATIENT
Start: 2021-07-06 | End: 2021-07-06

## 2021-07-06 RX ORDER — FAMOTIDINE 20 MG/1
20 TABLET, FILM COATED ORAL ONCE
Status: COMPLETED | OUTPATIENT
Start: 2021-07-06 | End: 2021-07-06

## 2021-07-06 RX ORDER — ONDANSETRON 2 MG/ML
INJECTION INTRAMUSCULAR; INTRAVENOUS AS NEEDED
Status: DISCONTINUED | OUTPATIENT
Start: 2021-07-06 | End: 2021-07-06 | Stop reason: SURG

## 2021-07-06 RX ORDER — FENTANYL CITRATE 50 UG/ML
INJECTION, SOLUTION INTRAMUSCULAR; INTRAVENOUS AS NEEDED
Status: DISCONTINUED | OUTPATIENT
Start: 2021-07-06 | End: 2021-07-06 | Stop reason: SURG

## 2021-07-06 RX ORDER — SODIUM CHLORIDE, SODIUM LACTATE, POTASSIUM CHLORIDE, CALCIUM CHLORIDE 600; 310; 30; 20 MG/100ML; MG/100ML; MG/100ML; MG/100ML
9 INJECTION, SOLUTION INTRAVENOUS CONTINUOUS
Status: DISCONTINUED | OUTPATIENT
Start: 2021-07-06 | End: 2021-07-06

## 2021-07-06 RX ORDER — FENTANYL CITRATE 50 UG/ML
50 INJECTION, SOLUTION INTRAMUSCULAR; INTRAVENOUS
Status: DISCONTINUED | OUTPATIENT
Start: 2021-07-06 | End: 2021-07-06 | Stop reason: HOSPADM

## 2021-07-06 RX ORDER — NALOXONE HCL 0.4 MG/ML
0.4 VIAL (ML) INJECTION AS NEEDED
Status: DISCONTINUED | OUTPATIENT
Start: 2021-07-06 | End: 2021-07-06 | Stop reason: HOSPADM

## 2021-07-06 RX ADMIN — ROCURONIUM BROMIDE 50 MG: 10 INJECTION, SOLUTION INTRAVENOUS at 07:44

## 2021-07-06 RX ADMIN — KETOROLAC TROMETHAMINE 15 MG: 15 INJECTION, SOLUTION INTRAMUSCULAR; INTRAVENOUS at 18:06

## 2021-07-06 RX ADMIN — ACETAMINOPHEN 650 MG: 325 TABLET, FILM COATED ORAL at 19:37

## 2021-07-06 RX ADMIN — FENTANYL CITRATE 50 MCG: 50 INJECTION INTRAMUSCULAR; INTRAVENOUS at 10:15

## 2021-07-06 RX ADMIN — DOCUSATE SODIUM 100 MG: 100 CAPSULE, LIQUID FILLED ORAL at 16:18

## 2021-07-06 RX ADMIN — ONDANSETRON 4 MG: 2 INJECTION INTRAMUSCULAR; INTRAVENOUS at 18:06

## 2021-07-06 RX ADMIN — LIDOCAINE HYDROCHLORIDE 0.5 ML: 10 INJECTION, SOLUTION EPIDURAL; INFILTRATION; INTRACAUDAL; PERINEURAL at 07:12

## 2021-07-06 RX ADMIN — FENTANYL CITRATE 50 MCG: 50 INJECTION, SOLUTION INTRAMUSCULAR; INTRAVENOUS at 07:37

## 2021-07-06 RX ADMIN — BUPIVACAINE HYDROCHLORIDE 55 ML: 2.5 INJECTION, SOLUTION EPIDURAL; INFILTRATION; INTRACAUDAL; PERINEURAL at 07:45

## 2021-07-06 RX ADMIN — KETOROLAC TROMETHAMINE 15 MG: 15 INJECTION, SOLUTION INTRAMUSCULAR; INTRAVENOUS at 11:38

## 2021-07-06 RX ADMIN — PROPOFOL 150 MG: 10 INJECTION, EMULSION INTRAVENOUS at 07:44

## 2021-07-06 RX ADMIN — NEOSTIGMINE 3 MG: 1 INJECTION INTRAVENOUS at 09:16

## 2021-07-06 RX ADMIN — DEXAMETHASONE SODIUM PHOSPHATE 8 MG: 4 INJECTION, SOLUTION INTRA-ARTICULAR; INTRALESIONAL; INTRAMUSCULAR; INTRAVENOUS; SOFT TISSUE at 07:50

## 2021-07-06 RX ADMIN — LIDOCAINE HYDROCHLORIDE 50 MG: 10 INJECTION, SOLUTION EPIDURAL; INFILTRATION; INTRACAUDAL; PERINEURAL at 07:44

## 2021-07-06 RX ADMIN — FAMOTIDINE 20 MG: 20 TABLET ORAL at 07:04

## 2021-07-06 RX ADMIN — ACETAMINOPHEN 650 MG: 325 TABLET, FILM COATED ORAL at 16:18

## 2021-07-06 RX ADMIN — HYDROMORPHONE HYDROCHLORIDE 0.5 MG: 1 INJECTION, SOLUTION INTRAMUSCULAR; INTRAVENOUS; SUBCUTANEOUS at 10:38

## 2021-07-06 RX ADMIN — FENTANYL CITRATE 50 MCG: 50 INJECTION, SOLUTION INTRAMUSCULAR; INTRAVENOUS at 09:18

## 2021-07-06 RX ADMIN — OXYCODONE 5 MG: 5 TABLET ORAL at 14:40

## 2021-07-06 RX ADMIN — CEFAZOLIN SODIUM 2 G: 10 INJECTION, POWDER, FOR SOLUTION INTRAVENOUS at 07:47

## 2021-07-06 RX ADMIN — KETOROLAC TROMETHAMINE 15 MG: 30 INJECTION, SOLUTION INTRAMUSCULAR; INTRAVENOUS at 09:25

## 2021-07-06 RX ADMIN — EPHEDRINE SULFATE 10 MG: 50 INJECTION INTRAVENOUS at 07:47

## 2021-07-06 RX ADMIN — ONDANSETRON 4 MG: 2 INJECTION INTRAMUSCULAR; INTRAVENOUS at 09:00

## 2021-07-06 RX ADMIN — HEPARIN SODIUM 5000 UNITS: 5000 INJECTION, SOLUTION INTRAVENOUS; SUBCUTANEOUS at 07:13

## 2021-07-06 RX ADMIN — ONDANSETRON 4 MG: 2 INJECTION INTRAMUSCULAR; INTRAVENOUS at 11:38

## 2021-07-06 RX ADMIN — PROPOFOL 25 MCG/KG/MIN: 10 INJECTION, EMULSION INTRAVENOUS at 07:48

## 2021-07-06 RX ADMIN — GABAPENTIN 100 MG: 100 CAPSULE ORAL at 13:59

## 2021-07-06 RX ADMIN — DEXAMETHASONE SODIUM PHOSPHATE 4 MG: 10 INJECTION, SOLUTION INTRAMUSCULAR; INTRAVENOUS at 07:45

## 2021-07-06 RX ADMIN — SODIUM CHLORIDE, POTASSIUM CHLORIDE, SODIUM LACTATE AND CALCIUM CHLORIDE: 600; 310; 30; 20 INJECTION, SOLUTION INTRAVENOUS at 08:32

## 2021-07-06 RX ADMIN — ACETAMINOPHEN 1000 MG: 500 TABLET, FILM COATED ORAL at 07:04

## 2021-07-06 RX ADMIN — GLYCOPYRROLATE 0.6 MG: 0.4 INJECTION INTRAMUSCULAR; INTRAVENOUS at 09:16

## 2021-07-06 RX ADMIN — FENTANYL CITRATE 50 MCG: 50 INJECTION INTRAMUSCULAR; INTRAVENOUS at 10:05

## 2021-07-06 RX ADMIN — ACETAMINOPHEN 650 MG: 325 TABLET, FILM COATED ORAL at 11:38

## 2021-07-06 RX ADMIN — SODIUM CHLORIDE, POTASSIUM CHLORIDE, SODIUM LACTATE AND CALCIUM CHLORIDE 75 ML/HR: 600; 310; 30; 20 INJECTION, SOLUTION INTRAVENOUS at 11:15

## 2021-07-06 RX ADMIN — SODIUM CHLORIDE, POTASSIUM CHLORIDE, SODIUM LACTATE AND CALCIUM CHLORIDE 9 ML/HR: 600; 310; 30; 20 INJECTION, SOLUTION INTRAVENOUS at 07:12

## 2021-07-06 RX ADMIN — MIDAZOLAM HYDROCHLORIDE 2 MG: 1 INJECTION, SOLUTION INTRAMUSCULAR; INTRAVENOUS at 07:48

## 2021-07-06 RX ADMIN — GABAPENTIN 100 MG: 100 CAPSULE ORAL at 22:11

## 2021-07-06 NOTE — ANESTHESIA POSTPROCEDURE EVALUATION
Patient: Maryann Bettencourt    Procedure Summary     Date: 07/06/21 Room / [facility]:  [facility]  [facility]    Anesthesia Start: 0737 Anesthesia Stop:     Procedure: EXPLORATORY LAPAROTOMY, TOTAL ABDOMINAL HYSTERECTOMY, BILATERAL SALPINGO OOPHORECTOMY (N/A Abdomen) Diagnosis:       Pelvic mass      (Pelvic mass [R19.00])    Surgeons: Rox Escalera MD Provider: Fawad Conklin MD    Anesthesia Type: general ASA Status: 1          Anesthesia Type: general    Vitals  Vitals Value Taken Time   BP     Temp     Pulse 89 07/06/21 0953   Resp     SpO2 95 % 07/06/21 0953   Vitals shown include unvalidated device data.        Post Anesthesia Care and Evaluation    Patient location during evaluation: PACU  Patient participation: complete - patient participated  Level of consciousness: awake and alert  Pain management: adequate  Airway patency: patent  Anesthetic complications: No anesthetic complications  PONV Status: none  Cardiovascular status: hemodynamically stable and acceptable  Respiratory status: nonlabored ventilation, acceptable and nasal cannula  Hydration status: acceptable    Comments: VSS in PACU. RN at bedside. T 97.8, RR 8, HR 72, /79, SpO2 95% 2L NC

## 2021-07-06 NOTE — H&P
"Patient Name: Maryann Bettencourt  : 1965   MRN: [id number]      Referring Physician: Nemesio Cid MD     Chief Complaint:        Chief Complaint   Patient presents with   • Mass         History of Present Illness: Maryann Bettencourt is a 56 y.o. female who is here today as a consultation for gynecologic oncology.  She reports 6 to 9 months of a feeling of pressure or \"laying on something\" when lying on her stomach to get massages.  She also notes some right lower back pain but she is unsure if this is related.  She has recently lost about 15 pounds, but this was desired and with much effort by cutting out potatoes, bread, and pasta.  She denies any bowel or bladder dysfunction.  She does have some nocturia 2-3 times a night, but this is stable for several years.  She is able to feel the mass through her belly.  She is otherwise well with no past medical history.  She is sexually active and has concerns about impact of hysterectomy on sexual function.  Reports restless leg.         Problem   Pelvic Mass     Referred by Dr. Nemesio Cid     2021: TVUS with uterus is \"severely enlarged \".  There is extensive shadowing within the myometrium.  There is a large solid mass in the right adnexa measuring 13 x 8 x 9 cm with an internal cystic area and a second mass projecting to the left of midline measuring 6 x 6 x 5 cm.  They cannot visualize the endometrium.               Past Medical History:   Medical History        Past Medical History:   Diagnosis Date   • Pelvic mass              Past Surgical History:   Surgical History         Past Surgical History:   Procedure Laterality Date   • ENDOMETRIAL ABLATION                Family History:         Family History   Problem Relation Age of Onset   • Coronary artery disease Father     • Kidney cancer Father     • Diabetes Father     • Hypertension Father     • Stroke Father     • Lung cancer Mother     • Brain cancer Mother     • Hypertension Sister     • Arthritis " Paternal Grandmother     • Breast cancer Neg Hx     • Ovarian cancer Neg Hx     • Uterine cancer Neg Hx     • Colon cancer Neg Hx     • Melanoma Neg Hx     • Prostate cancer Neg Hx           Social History:   Social History   Social History            Socioeconomic History   • Marital status:        Spouse name: Not on file   • Number of children: Not on file   • Years of education: Not on file   • Highest education level: Not on file   Tobacco Use   • Smoking status: Former Smoker       Years: 30.00       Quit date:        Years since quittin.4   • Smokeless tobacco: Never Used   Substance and Sexual Activity   • Alcohol use: Yes   • Drug use: Never   • Sexual activity: Yes       Partners: Male       Birth control/protection: Post-menopausal            Past OB/GYN History:   OB History   No obstetric history on file.         Health Maintenance:   Mammogram: Date: 2021  Colonoscopy: Date:  results: Normal     Medications:      Current Outpatient Medications:   •  Loratadine (CLARITIN PO), Take  by mouth., Disp: , Rfl:      Allergies:   No Known Allergies     Review of Systems:   Review of Systems   Constitutional: Negative for appetite change, chills, fatigue, fever and unexpected weight change.   HENT: Negative for congestion, hearing loss, sneezing, sore throat and tinnitus.    Eyes: Negative for visual disturbance.   Respiratory: Negative for cough, shortness of breath and wheezing.    Cardiovascular: Negative for chest pain, palpitations and leg swelling.   Gastrointestinal: Positive for abdominal distention. Negative for abdominal pain, constipation, diarrhea, nausea and vomiting.   Genitourinary: Positive for frequency (At night). Negative for dyspareunia, dysuria, hematuria, pelvic pain, urgency and vaginal bleeding.   Musculoskeletal: Negative for arthralgias, back pain and myalgias.   Skin: Negative for color change, pallor and rash.   Neurological: Negative for dizziness,  "light-headedness, numbness and headaches.   Hematological: Negative for adenopathy. Does not bruise/bleed easily.   Psychiatric/Behavioral: Negative for dysphoric mood. The patient is not nervous/anxious.          Objective      Physical Exam:  Vital Signs:   Vitals       Vitals:     06/07/21 1339   BP: 147/67   Pulse: 83   Resp: 16   Temp: 96.4 °F (35.8 °C)   TempSrc: Temporal   Weight: 57.8 kg (127 lb 8 oz)   Height: 162.6 cm (64\")   PainSc: 0-No pain         BMI: Body mass index is 21.89 kg/m².   ECOG PS: 0                PHQ-2 Depression Screening  Little interest or pleasure in doing things? 0   Feeling down, depressed, or hopeless? 0   PHQ-2 Total Score 0      Physical Exam  Vitals and nursing note reviewed. Exam conducted with a chaperone present.   Constitutional:       General: She is not in acute distress.     Appearance: Normal appearance. She is normal weight. She is not ill-appearing or toxic-appearing.   HENT:      Head: Normocephalic.      Nose: Nose normal.      Mouth/Throat:      Mouth: Mucous membranes are dry.   Eyes:      Extraocular Movements: Extraocular movements intact.      Pupils: Pupils are equal, round, and reactive to light.   Cardiovascular:      Rate and Rhythm: Normal rate and regular rhythm.      Heart sounds: No murmur heard.   No friction rub. No gallop.    Pulmonary:      Effort: Pulmonary effort is normal. No respiratory distress.      Breath sounds: Normal breath sounds. No wheezing, rhonchi or rales.   Abdominal:      General: Abdomen is flat. There is no distension.      Palpations: There is mass (There is a firm, mobile right-sided abdominal mass to the level of the umbilicus).      Tenderness: There is no abdominal tenderness. There is no guarding or rebound.   Genitourinary:     Comments: External genitalia normal.  Vagina without discharge.  Cervix is deviated to patient's left.  Irregularly shaped mass approximately 20 weeks in size.  Difficult to distinguish the adnexa " from the uterus.  Rectovaginal exam deferred  Musculoskeletal:         General: Normal range of motion.      Cervical back: Normal range of motion.   Skin:     General: Skin is warm and dry.   Neurological:      General: No focal deficit present.      Mental Status: She is alert and oriented to person, place, and time. Mental status is at baseline.      Cranial Nerves: No cranial nerve deficit.      Motor: No weakness.   Psychiatric:         Mood and Affect: Mood normal.         Behavior: Behavior normal.         Thought Content: Thought content normal.         Judgment: Judgment normal.            Assessment / Plan    Maryann Bettencourt is a 56 y.o. who presents two large symptomatic solid pelvic masses. Images visualized by me and demonstrate findings most consistent with uterine fibroids..  Unfortunately there is no way to determine whether or not this is malignant or benign without surgical intervention (though I favor benign). I recommend proceeding with total abdominal hysterectomy, bilateral salpingo-oophorectomy via abdominal approach. We discussed the risks of surgery including bleeding, infection, wound breakdown, blood clots, injury to surrounding organs including bowel, bladder, ureters, blood vessels and nerves requiring additional intervention. She verbally consented and will be scheduled for surgery in the near future.  We did review her recovery next dictations for surgery including anticipated hospital stay.  We discussed multimodality pain control with the intention to avoid narcotics.  Patient has a history of constipation with hemorrhoids be sure that we keep her on a good bowel regimen.     Pain assessment was performed today as a part of patient’s care.  For patients with pain related to surgery, gynecologic malignancy or cancer treatment, the plan is as noted in the assessment/plan.  For patients with pain not related to these issues, they are to seek any further needed care from a more appropriate  provider, such as PCP.       Depression assessment was performed as indicated. For patients with prior diagnoses of anxiety/depression and currently on medications, they were encouraged to seek any further needed care from their PCP or mental health professional. For those patients without a prior diagnosis and concern for depression, the plan is as noted in the assessment/plan.      Diagnoses and all orders for this visit:     1. Pelvic mass (Primary)  -     Case Request; Standing  -     CBC & Differential; Future  -     Comprehensive Metabolic Panel; Future  -     Type & Screen; Future  -     ECG 12 Lead; Future  -     Case Request  -     Liquid-based Pap Smear, Diagnostic; Future     Other orders  -     Verify NPO Status; Future  -     Obtain Informed Consent; Future  -     Provide Instructions to Patient on NPO Status; Future  -     Chlorhexidine Skin Prep; Future  -     Instruct Patient on Coughing, Deep Breathing & Incentive Spirometry; Future  -     Verify NPO Status; Standing  -     Obtain Informed Consent If Not Already Obtained; Standing  -     Notify Physician - Standard; Standing  -     Initiate Observation Status; Standing  -     acetaminophen (TYLENOL) tablet 975 mg  -     ceFAZolin (ANCEF) 2 g in sodium chloride 0.9 % 100 mL IVPB  -     heparin (porcine) 5000 UNIT/ML injection 5,000 Units        Follow Up: For postop visit following surgery     Pricilla Santiago MD  Gynecologic Oncology      Patient was seen and examined with Dr. Santiago,  resident, who performed portions of the examination and documentation for this patient's care under my direct supervision.  I agree with the above documentation and plan.     MDM  Number of Diagnoses or Management Options  Hemorrhoids, unspecified hemorrhoid type: minor  Pelvic mass: new, needed workup     Amount and/or Complexity of Data Reviewed  Clinical lab tests: ordered (CBC, CMP, T&S)  Tests in the radiology section of CPT®: reviewed  Tests in the medicine  section of CPT®: ordered (EKG)  Discussion of test results with the performing providers: no  Decide to obtain previous medical records or to obtain history from someone other than the patient: no  Obtain history from someone other than the patient: no  Review and summarize past medical records: yes  Discuss the patient with other providers: no  Independent visualization of images, tracings, or specimens: yes (Visualized images from TVUS)     Risk of Complications, Morbidity, and/or Mortality  Presenting problems: moderate  Diagnostic procedures: high  Management options: high RASHID Escalera MD  Gynecologic Oncology      Please note that portions of this note may have been completed with a voice recognition program.             Revision History                      [facility] Pre-op    Full history and physical note from office is up to date.     There were no vitals taken for this visit.  Denies allergy to contrast dye or latex  IMM:  Influenza: No  Pneumococcal: No  Tetanus: Probably not  Lungs: Clear to auscultation bilaterally to the bases  Cardiovascular: S1-S2 without rubs murmurs or gallops  Abdomen: Soft, nontender, bowel sounds present throughout    LAB Results:  Lab Results   Component Value Date    WBC 7.80 07/02/2021    HGB 13.5 07/02/2021    HCT 42.8 07/02/2021    MCV 91.6 07/02/2021     07/02/2021    NEUTROABS 5.39 07/02/2021    GLUCOSE 98 07/02/2021    BUN 22 (H) 07/02/2021    CREATININE 0.75 07/02/2021    EGFRIFNONA 79 07/02/2021     07/02/2021    K 4.0 07/02/2021     07/02/2021    CO2 28.0 07/02/2021    CALCIUM 9.5 07/02/2021    ALBUMIN 4.40 07/02/2021    AST 17 07/02/2021    ALT 11 07/02/2021    BILITOT 0.3 07/02/2021       Cancer Staging (if applicable)  Cancer Patient: __ yes __no __unknown__N/A; If yes, clinical stage T:__ N:__M:__, stage group or __N/A  Impression: Pelvic mass  Plan: Exploratory laparotomy, total abdominal hysterectomy, bilateral  salpingo-oophorectomy.  NAMRATA Fields 7/6/2021 06:52 EDT

## 2021-07-06 NOTE — ANESTHESIA PROCEDURE NOTES
Peripheral Block      Patient reassessed immediately prior to procedure    Patient location during procedure: OR  Reason for block: at surgeon's request and post-op pain management  Performed by  Anesthesiologist: Fawad Conklin MD  Preanesthetic Checklist  Completed: patient identified, IV checked, site marked, risks and benefits discussed, surgical consent, monitors and equipment checked, pre-op evaluation and timeout performed  Prep:  Pt Position: supine  Sterile barriers:cap, gloves, sterile barriers and mask  Prep: ChloraPrep  Patient monitoring: blood pressure monitoring, continuous pulse oximetry and EKG  Procedure  Sedation:yes  Performed under: general  Guidance:ultrasound guided  Images:still images obtained, printed/placed on chart    Laterality:Bilateral  Block Type:TAP  Injection Technique:single-shot  Needle Type:short-bevel and echogenic  Needle Gauge:20 G  Resistance on Injection: none    Medications Used: bupivacaine PF (MARCAINE) 0.25 % injection, 55 mL  dexamethasone sodium phosphate injection, 4 mg  Med admintered at 7/6/2021 7:45 AM      Medications  Comment:Block Injection:  LA dose divided between Right and Left block        Post Assessment  Injection Assessment: negative aspiration for heme, incremental injection and no paresthesia on injection  Patient Tolerance:comfortable throughout block  Complications:no  Additional Notes      Under Ultrasound guidance, a BBraun 4inch 360 degree needle was advanced with Normal Saline hydro dissection of tissue.  The Internal Oblique and Transversus Abdominus muscles where visualized.  At or before the aponeurosis of Internal Oblique, local anesthetic spread was visualized in the Transversus Abdominus Plane. Injection was made incrementally with aspiration every 5 mls.  There was no  intravascular injection,  injection pressure was normal, there was no neural injection, and the procedure was completed without difficulty.  Thank You.

## 2021-07-06 NOTE — PLAN OF CARE
"Goal Outcome Evaluation:           Progress: improving     VSS. Incision C/D/I with abdominal binder in place. Marino was removed at 1520; pt is due to void. Has ambulated to restroom with stand by assist. Up in chair. C/o \"cramping\" sensation and lower back pain; pain controlled with PO medication.  at bedside.   "

## 2021-07-06 NOTE — NURSING NOTE
Marino removed at 1520 per order. Pt educated about her need to void within 4-6 hours of removal. Pt due to void at this time.

## 2021-07-06 NOTE — ANESTHESIA PREPROCEDURE EVALUATION
Anesthesia Evaluation     Patient summary reviewed and Nursing notes reviewed                Airway   Mallampati: I  TM distance: >3 FB  Neck ROM: full  No difficulty expected  Dental      Pulmonary - negative pulmonary ROS   Cardiovascular - negative cardio ROS        Neuro/Psych- negative ROS  GI/Hepatic/Renal/Endo - negative ROS     Musculoskeletal (-) negative ROS    Abdominal    Substance History - negative use     OB/GYN negative ob/gyn ROS         Other                        Anesthesia Plan    ASA 1     general   (Tap)  intravenous induction     Anesthetic plan, all risks, benefits, and alternatives have been provided, discussed and informed consent has been obtained with: patient.

## 2021-07-06 NOTE — ANESTHESIA PROCEDURE NOTES
Airway  Urgency: elective    Date/Time: 7/6/2021 7:47 AM  Airway not difficult    General Information and Staff    Patient location during procedure: OR  Anesthesiologist: Fawad Conklin MD  CRNA: Zehra Earl CRNA    Indications and Patient Condition  Indications for airway management: airway protection    Preoxygenated: yes  MILS not maintained throughout  Mask difficulty assessment: 2 - vent by mask + OA or adjuvant +/- NMBA    Final Airway Details  Final airway type: endotracheal airway      Successful airway: ETT  Cuffed: yes   Successful intubation technique: direct laryngoscopy  Facilitating devices/methods: anterior pressure/BURP and Bougie  Endotracheal tube insertion site: oral  Blade: Allison  Blade size: 3  ETT size (mm): 7.0  Cormack-Lehane Classification: grade IIb - view of arytenoids or posterior of glottis only  Placement verified by: chest auscultation and capnometry   Measured from: lips  ETT/EBT  to lips (cm): 20  Number of attempts at approach: 2 (Difficulty passing ETT via cords)  Assessment: lips, teeth, and gum same as pre-op and atraumatic intubation    Additional Comments  Negative epigastric sounds, Breath sound equal bilaterally with symmetric chest rise and fall

## 2021-07-06 NOTE — OP NOTE
Operative Note     Patient Name: Maryann Bettencourt  : 1965   MRN: [id number]   Date: 21  Time: : EDT    Date of Service:  21  Time of Service:   EDT    Surgical Staff: Surgeon(s) and Role:     * Rox Escalera MD - Primary  Jana Ocampo MD - Resident Assisting  NAMRATA Valdes - Assisting   Additional Staff: NAMRATA Valdes was responsible for performing the following activities: Retraction, Suction and Irrigation and their skilled assistance was necessary for the success of this case.     Pre-operative diagnosis(es): Pre-Op Diagnosis Codes:     * Pelvic mass [R19.00]     Post-operative diagnosis(es): Post-Op Diagnosis Codes:     * Pelvic mass [R19.00]   Procedure(s): Procedure(s):  EXPLORATORY LAPAROTOMY, TOTAL ABDOMINAL HYSTERECTOMY, BILATERAL SALPINGO OOPHORECTOMY     Antibiotics: cefazolin (Ancef) ordered on call to OR     Anesthesia: Type: General with Block  ASA:  I     Objective      Operative findings:  Approximately 50 cc of serous ascites encountered on entry.  Large solid mass completely replacing the right ovary.  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  Left adnexa normal in appearance.  Uterus with a pedunculated 3 cm fibroid at the left cornua.  Uterus and cervix otherwise normal.  Abdominal survey otherwise unremarkable   Specimens removed: ID Type Source Tests Collected by Time   A (Not marked as sent) :  Tissue Ovary, Right with Fallopian Tube TISSUE PATHOLOGY EXAM Rox Escalera MD 2021 0823   B (Not marked as sent) :  Tissue Uterus, Cervix, L Fallopian Tube, L Ovary TISSUE PATHOLOGY EXAM Rox Escalera MD 2021 0848      Fluid Intake and Output: I/O this shift:  In: 1300 [I.V.:1300]  Out: 550 [Urine:450; Blood:100]   Blood products used: No   Drains: Urethral Catheter Silicone 16 Fr. (Active)      Implant Information: Implant Name Type Inv. Item Serial No.  Lot No. LRB No. Used Action    HEMOST ABS SURGICEL PWDR 3GM - [id number] - [id number] Implant HEMOST ABS SURGICEL PWDR 3GM [id number] [employer]  [employer] J [employer] J [id number] N/A 1 Implanted      Complications:  None   Intraoperative consult(s):    Condition: stable   Disposition: to PACU and then admit to  medical - surgical floor       INDICATIONS: Maryann Bettencourt is a 56 y.o. year old female who presented with a solid pelvic mass consistent with a possible exophytic fibroid.  All risks, benefits, indications and alternatives were discussed and she was appropriately consented prior to the procedure.     PROCEDURE IN DETAIL:  The patient was met in the patient receiving area on the day of surgery.  The indications, risks, benefits and alternatives of the procedure were reviewed in detail. The patient was taken to the operating room where general anesthesia was obtained without difficulty.  A time-out procedure for safety was performed to confirm patient name, medical record number and procedure being performed.  Antibiotics were administered preoperatively. She was positioned dorsal lithotomy position.  Examination under anesthesia revealed the findings as described above.  The patient was then prepped and draped in the usual sterile fashion.  A Marino catheter was inserted under sterile conditions.    Attention was turned to the abdominal incision.  A midline supraumbilical skin incision was made skirting to the left of the umbilicus.  This incision was carried down to the underlying layer of fascia.  The fascia was incised in the midline and the incision extended superior and inferiorly with Bovie cautery.  The peritoneum was then entered sharply and the peritoneal incision extended superiorly and inferiorly.  Pelvic fluid was collected for ascites but this ended up not being sent given the benign ovarian mass.  The large right ovarian mass was delivered through the incision and elevated up out of the abdomen.  The ureter was palpated well out  of the field.  2 Rosa clamps were placed just inferior to the mass for containment.  The mass was then amputated using the Enseal device.  The mass was handed off for frozen pathology which came back as benign.     A self-retaining retractor was then placed, with attention paid to minimize the risk of lower extremity neurologic injury. The retroperitoneal spaces were opened bilaterally and the ureters were identified bilaterally.  They were dissected off bluntly and kept out of the surgical field. The left infundibulopelvic ligaments were ultimately skeletonized, clamped, cut with the Enseal device The utero-ovarian ligaments were then skeletonized.  The left ovary was mobilized out of the posterior cul-de-sac.  The bladder was similarly carefully dissected without injury to the bladder.  On entering the retroperitoneal space the round ligaments had been cauterized from the pelvic sidewalls.  The pararectal spaces were then opened.  The uterine blood vessels were skeletonized, clamped, cut and ligated with the Enseal device bilaterally.  There was bleeding noted from the right uterine pedicle.  This was grasped with a right angle clamp well and ligated using an 0 Vicryl tie.  The ureter was palpated well below the level of the clamp.  The remaining parametrial tissue including the cardinal and uterosacral ligaments were sequentially clamped, cut and ligated with the Enseal device.  Once beneath the cervix the upper vagina was clamped and the specimen transected and sent for pathologic evaluation.  Attention was then turned to completion of the hysterectomy portion of the procedure.  The vaginal cuff angles were closed with Rayna stitches of 0 polysorb.  The vagina was then closed using interrupted figure-of-eight sutures of 0 polysorb. The abdomen and pelvis were irrigated and hemostasis was confirmed.    Irrigation was performed and hemostasis was confirmed. After confirmation of hemostasis the packs and  retractors were removed. Exploration of the abdominal wound was performed and no retained sponges or objects were identified.  The fascia was closed using a #0 looped PDS suture from both corners.  The extrafascial tissues were irrigated and made hemostatic with Bovie cautery.  Subcutaneous tissue was reapproximated with 2-0 Vicryl. The skin was closed with 3-0 Vicryl in a subcuticular fashion.    The incisions were noted to be hemostatic and a dressing was applied to the incision site. The patient was reversed from endotracheal anesthesia, and taken to the recovery room in stable condition. All sponge, lap and needle counts were correct X 2.

## 2021-07-07 VITALS
DIASTOLIC BLOOD PRESSURE: 71 MMHG | TEMPERATURE: 97.6 F | HEART RATE: 68 BPM | SYSTOLIC BLOOD PRESSURE: 130 MMHG | RESPIRATION RATE: 16 BRPM | OXYGEN SATURATION: 93 %

## 2021-07-07 PROBLEM — R19.00 PELVIC MASS: Status: RESOLVED | Noted: 2021-06-07 | Resolved: 2021-07-07

## 2021-07-07 LAB
ANION GAP SERPL CALCULATED.3IONS-SCNC: 8 MMOL/L (ref 5–15)
BASOPHILS # BLD AUTO: 0.06 10*3/MM3 (ref 0–0.2)
BASOPHILS NFR BLD AUTO: 0.4 % (ref 0–1.5)
BUN SERPL-MCNC: 12 MG/DL (ref 6–20)
BUN/CREAT SERPL: 17.6 (ref 7–25)
CALCIUM SPEC-SCNC: 9 MG/DL (ref 8.6–10.5)
CHLORIDE SERPL-SCNC: 104 MMOL/L (ref 98–107)
CO2 SERPL-SCNC: 26 MMOL/L (ref 22–29)
CREAT SERPL-MCNC: 0.68 MG/DL (ref 0.57–1)
DEPRECATED RDW RBC AUTO: 46.8 FL (ref 37–54)
EOSINOPHIL # BLD AUTO: 0.04 10*3/MM3 (ref 0–0.4)
EOSINOPHIL NFR BLD AUTO: 0.3 % (ref 0.3–6.2)
ERYTHROCYTE [DISTWIDTH] IN BLOOD BY AUTOMATED COUNT: 14.2 % (ref 12.3–15.4)
GFR SERPL CREATININE-BSD FRML MDRD: 89 ML/MIN/1.73
GLUCOSE SERPL-MCNC: 97 MG/DL (ref 65–99)
HCT VFR BLD AUTO: 37 % (ref 34–46.6)
HGB BLD-MCNC: 11.7 G/DL (ref 12–15.9)
IMM GRANULOCYTES # BLD AUTO: 0.05 10*3/MM3 (ref 0–0.05)
IMM GRANULOCYTES NFR BLD AUTO: 0.3 % (ref 0–0.5)
LYMPHOCYTES # BLD AUTO: 2.09 10*3/MM3 (ref 0.7–3.1)
LYMPHOCYTES NFR BLD AUTO: 14.5 % (ref 19.6–45.3)
MCH RBC QN AUTO: 28.6 PG (ref 26.6–33)
MCHC RBC AUTO-ENTMCNC: 31.6 G/DL (ref 31.5–35.7)
MCV RBC AUTO: 90.5 FL (ref 79–97)
MONOCYTES # BLD AUTO: 0.74 10*3/MM3 (ref 0.1–0.9)
MONOCYTES NFR BLD AUTO: 5.1 % (ref 5–12)
NEUTROPHILS NFR BLD AUTO: 11.48 10*3/MM3 (ref 1.7–7)
NEUTROPHILS NFR BLD AUTO: 79.4 % (ref 42.7–76)
NRBC BLD AUTO-RTO: 0 /100 WBC (ref 0–0.2)
PLATELET # BLD AUTO: 254 10*3/MM3 (ref 140–450)
PMV BLD AUTO: 12.4 FL (ref 6–12)
POTASSIUM SERPL-SCNC: 4 MMOL/L (ref 3.5–5.2)
RBC # BLD AUTO: 4.09 10*6/MM3 (ref 3.77–5.28)
SODIUM SERPL-SCNC: 138 MMOL/L (ref 136–145)
WBC # BLD AUTO: 14.46 10*3/MM3 (ref 3.4–10.8)

## 2021-07-07 PROCEDURE — 80048 BASIC METABOLIC PNL TOTAL CA: CPT | Performed by: OBSTETRICS & GYNECOLOGY

## 2021-07-07 PROCEDURE — 25010000002 KETOROLAC TROMETHAMINE PER 15 MG: Performed by: OBSTETRICS & GYNECOLOGY

## 2021-07-07 PROCEDURE — 85025 COMPLETE CBC W/AUTO DIFF WBC: CPT | Performed by: OBSTETRICS & GYNECOLOGY

## 2021-07-07 PROCEDURE — 25010000002 HEPARIN (PORCINE) PER 1000 UNITS: Performed by: OBSTETRICS & GYNECOLOGY

## 2021-07-07 RX ORDER — DOCUSATE SODIUM 250 MG
250 CAPSULE ORAL 2 TIMES DAILY
Qty: 60 CAPSULE | Refills: 2 | Status: SHIPPED | OUTPATIENT
Start: 2021-07-07 | End: 2021-07-29

## 2021-07-07 RX ORDER — ACETAMINOPHEN 325 MG/1
650 TABLET ORAL EVERY 4 HOURS
Qty: 60 TABLET | Refills: 0 | Status: SHIPPED | OUTPATIENT
Start: 2021-07-07 | End: 2021-07-29

## 2021-07-07 RX ORDER — ONDANSETRON 4 MG/1
4 TABLET, ORALLY DISINTEGRATING ORAL EVERY 8 HOURS PRN
Qty: 20 TABLET | Refills: 0 | Status: SHIPPED | OUTPATIENT
Start: 2021-07-07 | End: 2021-07-29

## 2021-07-07 RX ORDER — OXYCODONE HYDROCHLORIDE 5 MG/1
5 TABLET ORAL EVERY 4 HOURS PRN
Qty: 20 TABLET | Refills: 0 | Status: SHIPPED | OUTPATIENT
Start: 2021-07-07 | End: 2021-07-16

## 2021-07-07 RX ADMIN — KETOROLAC TROMETHAMINE 15 MG: 15 INJECTION, SOLUTION INTRAMUSCULAR; INTRAVENOUS at 11:41

## 2021-07-07 RX ADMIN — HEPARIN SODIUM 5000 UNITS: 5000 INJECTION INTRAVENOUS; SUBCUTANEOUS at 05:06

## 2021-07-07 RX ADMIN — DOCUSATE SODIUM 100 MG: 100 CAPSULE, LIQUID FILLED ORAL at 00:31

## 2021-07-07 RX ADMIN — DOCUSATE SODIUM 100 MG: 100 CAPSULE, LIQUID FILLED ORAL at 08:33

## 2021-07-07 RX ADMIN — ACETAMINOPHEN 650 MG: 325 TABLET, FILM COATED ORAL at 05:05

## 2021-07-07 RX ADMIN — KETOROLAC TROMETHAMINE 15 MG: 15 INJECTION, SOLUTION INTRAMUSCULAR; INTRAVENOUS at 00:31

## 2021-07-07 RX ADMIN — ACETAMINOPHEN 650 MG: 325 TABLET, FILM COATED ORAL at 08:33

## 2021-07-07 RX ADMIN — GABAPENTIN 100 MG: 100 CAPSULE ORAL at 13:42

## 2021-07-07 RX ADMIN — ACETAMINOPHEN 650 MG: 325 TABLET, FILM COATED ORAL at 11:41

## 2021-07-07 RX ADMIN — HEPARIN SODIUM 5000 UNITS: 5000 INJECTION INTRAVENOUS; SUBCUTANEOUS at 13:42

## 2021-07-07 RX ADMIN — ACETAMINOPHEN 650 MG: 325 TABLET, FILM COATED ORAL at 00:31

## 2021-07-07 RX ADMIN — KETOROLAC TROMETHAMINE 15 MG: 15 INJECTION, SOLUTION INTRAMUSCULAR; INTRAVENOUS at 05:06

## 2021-07-07 RX ADMIN — GABAPENTIN 100 MG: 100 CAPSULE ORAL at 05:06

## 2021-07-07 NOTE — PLAN OF CARE
Goal Outcome Evaluation:           Progress: improving     VSS. Incision intact with small scant drainage on abdominal binder; approximated well. Pain controlled with scheduled medications. + flatus per patient; no BM.  at bedside. Awaiting discharge orders.

## 2021-07-07 NOTE — DISCHARGE SUMMARY
Patient Name: Maryann Bettencourt  Admission Date: 7/6/2021   Date of Discharge:  7/7/2021  Attending Physician: Dr. 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        History and Hospital Course: This is a 56 y.o. female who underwent a Procedure(s):  EXPLORATORY LAPAROTOMY, TOTAL ABDOMINAL HYSTERECTOMY, BILATERAL SALPINGO OOPHORECTOMY for pelvic mass. Her procedure was uncomplicated. Her hospital problem list was as follows:  1. Post-operative: Her pain was well controlled with oral pain medications. She tolerated a regular diet without issue. She was able to void spontaneously following severino catheter removal.   2. Other issues: none  3. Prophylaxis: SCDs, IS.    At time of discharge, the patient was in good condition. She was tolerating good oral intake, and her pain was well controlled. She was given appropriate prescriptions prior to discharge. All questions were answered and written discharge instructions were given to the patient. She was to follow up with as listed below.    Pertinent Test Results: labs: post-op hgb 11.7, intra-op path fibrothecoma  Discharge Plan:  Condition on Discharge:  stable   Diet: Regular diet   Activity at Discharge: Ad carie  Discharge Medications:      Discharge Medications      ASK your doctor about these medications      Instructions Start Date   CLARITIN PO   1 tablet, Oral, Daily PRN      naproxen sodium 220 MG tablet  Commonly known as: ALEVE   220 mg, Oral, 2 Times Daily PRN           Follow-up Appointments  Future Appointments   Date Time Provider Department Center   7/29/2021  1:30 PM Rox Escalera MD [facility]       Test Results Pending at Discharge:   Pending Labs     Order Current Status    Tissue Pathology Exam In process          Time: Discharge 30 min     Jana Ocampo MD  07/07/21  12:11  EDT

## 2021-07-07 NOTE — DISCHARGE INSTRUCTIONS
Discharge Instructions for Abdominal Surgery  Abdominal surgery is done through an incision in your belly. It may take a few weeks or longer to heal from the surgery. This sheet gives instructions on how to care for yourself once you’re home.    Medicines  Here is what to expect:  · You may be prescribed pain medicine. Do not wait until your pain becomes severe before taking the medicine. It may not work as well if you wait too long to take it between doses.  · Most surgeons prescribe stool softeners along with opioid prescriptions. Take these as prescribed.   · You may be prescribed antibiotics to help treat or prevent infection. Be sure to take all of the antibiotics even if you start to feel better.    Diet  Dietary tips include:  · Follow any diet instructions given by your healthcare provider. You may need to start with liquids and then slowly add solid foods back into your diet.   · If you have constipation, your healthcare provider may tell you to add more fiber to your diet. You may also be told to use a laxative or stool softener such as Miralax or Milk of Magnesia. These can often be bought over the counter.  · Drink plenty of fluids.    Activity  Recommendations include the following:  · Rest as often as needed.  · Ask your healthcare provider when you can shower or bathe. Have someone nearby in case you need help.  · Ask your family and friends to help with chores and errands.  · Don’t mow the lawn, vacuum, or do any strenuous activities for 4 to 6 weeks.  · Avoid lifting anything over 10 pounds for 4 to 6 weeks.  · Avoid driving until your healthcare provider says it’s OK.  · Walk as often as you feel able.  · Do the coughing and breathing exercises you were taught in the hospital. If you were given an incentive spirometer to help with breathing, use it as directed. This is important and will help prevent lung infections.   · Ask your healthcare provider when you can return to work.    Incision and  drain care  Do's and don'ts include the following:  · Keep your incision clean and dry. It’s OK to wash the skin around your incision with mild soap and water.  · If you have a dressing over your incision, change it as you were told. Replace the dressing if it becomes wet or dirty. In most cases, the dressing can be removed after 48 hours.  · If you have a drain, record the amount of drainage daily. You may also need to empty the drain and clean the attached tubing daily. Check with your healthcare provider if you can get your drain wet or if it needs to stay dry at all times.  · Don’t sit in a bathtub, pool, or hot tub until your incision is closed and any drains are removed.  · When coughing or sneezing, hold a pillow firmly against your incision with both hands. This is called “splinting.” Doing this helps protect your incision and decreases belly discomfort.  · Avoid picking, scratching, or pulling at your incision.  · Don’t use oils, or creams on your incision. Ask your healthcare provider before using lotions on your incision.    Follow-up  You will have one or more follow-up visits with your healthcare provider. These are needed to check how well you’re healing. Your drain, stitches, or staples may also be removed during these visits.    When to call the healthcare provider  Call your healthcare provider right away if you have any of the following:  · Fever of 100.4°F (38°C) or higher, or as advised by your healthcare provider  · Chest pain or trouble breathing  · Pain or tenderness in the leg  · Increased pain, redness, swelling, bleeding, or foul-smelling drainage at the incision site  · Incision separates or comes apart  · Pain or hardness in your belly that gets worse or isn’t relieved by pain medicine  · Nausea and vomiting that won’t go away  · Diarrhea that lasts more than 3 days  · Constipation or inability to pass gas for more than 3 days  · Dark-colored or bloody urine  · Bright red or dark black  stools  · Itchy, swollen skin; skin rash

## 2021-07-07 NOTE — PLAN OF CARE
Goal Outcome Evaluation:  Plan of Care Reviewed With: patient        Progress: improving  Outcome Summary: VSS. UOP adequate. Incision with scant drainage. Abd binder in place. Pain well controlled.

## 2021-07-07 NOTE — PROGRESS NOTES
Patient Name: Maryann Bettencourt  : 1965   MRN: [id number]     Subjective   Maryann Bettencourt is a 56 y.o. who is POD#1 from an exploratory laparotomy, total abdominal hysterectomy, and bilateral salpingo-oophorectomy for a right ovarian mass.    She is currently doing well and reports she slept well overnight. Pain is well controlled with oral pain medications, although she has only tried oxycodone once due to the side effect of nausea. She is tolerating PO without nausea or emesis but had low appetite yesterday and ate some chicken pot pie filling/broth and crackers. Voiding spontaneously. Not yet passing flatus/no BM. She is ambulating without difficulty. Denies any CP, SOB, or lower extremity swelling.     Objective     Physical Exam:  Vital Signs:   Vitals:    21 1525 21 2007 21 0023 21 0500   BP: 128/84 148/78 113/67 135/78   BP Location: Right arm Right arm Right arm Right arm   Patient Position: Lying Lying Lying Lying   Pulse: 73 69 78 69   Resp: 18 17 16 16   Temp: 97.7 °F (36.5 °C) 97.4 °F (36.3 °C) 97.7 °F (36.5 °C) 98.1 °F (36.7 °C)   TempSrc: Oral Oral Oral Oral   SpO2: 96% 97% 95% 95%     BMI: There is no height or weight on file to calculate BMI.   Weight:   Wt Readings from Last 3 Encounters:   21 56.7 kg (125 lb)   21 57.8 kg (127 lb 8 oz)     I&O: I/O last 3 completed shifts:  In: 2360 [P.O.:960; I.V.:1400]  Out: 2660 [Urine:2560; Blood:100]    Physical Exam  Constitutional:       General: She is not in acute distress.     Appearance: Normal appearance. She is normal weight.   HENT:      Head: Normocephalic and atraumatic.      Mouth/Throat:      Mouth: Mucous membranes are moist.   Eyes:      Extraocular Movements: Extraocular movements intact.      Conjunctiva/sclera: Conjunctivae normal.   Cardiovascular:      Rate and Rhythm: Normal rate and regular rhythm.      Heart sounds: Normal heart sounds.   Pulmonary:      Effort: Pulmonary effort is  normal.      Breath sounds: Normal breath sounds.   Abdominal:      General: Bowel sounds are normal.      Palpations: Abdomen is soft. There is no mass.      Tenderness: There is no guarding or rebound.      Comments: Appropriately tender. Midline vertical skin incision C/D/I with covering of skin glue.   Musculoskeletal:      Right lower leg: No edema.      Left lower leg: No edema.   Skin:     General: Skin is warm and dry.   Neurological:      General: No focal deficit present.      Mental Status: She is alert and oriented to person, place, and time.   Psychiatric:         Mood and Affect: Mood normal.         Behavior: Behavior normal.       Inpatient Medications:     Current Facility-Administered Medications:   •  acetaminophen (TYLENOL) tablet 650 mg, 650 mg, Oral, Q4H, Rox Escalera MD, 650 mg at 07/07/21 0505  •  docusate sodium (COLACE) capsule 100 mg, 100 mg, Oral, BID, Rox Escalera MD, 100 mg at 07/07/21 0031  •  gabapentin (NEURONTIN) capsule 100 mg, 100 mg, Oral, Q8H, Rox Escalera MD, 100 mg at 07/07/21 0506  •  heparin (porcine) 5000 UNIT/ML injection 5,000 Units, 5,000 Units, Subcutaneous, Q8H, Rxo Escalera MD, 5,000 Units at 07/07/21 0506  •  ketorolac (TORADOL) injection 15 mg, 15 mg, Intravenous, Q6H, Rox Escalera MD, 15 mg at 07/07/21 0506  •  lactated ringers infusion, 75 mL/hr, Intravenous, Continuous, Rox Escalera MD, Last Rate: 75 mL/hr at 07/06/21 1115, 75 mL/hr at 07/06/21 1115  •  ondansetron (ZOFRAN) injection 4 mg, 4 mg, Intravenous, Q6H PRN, Rox Escalera MD, 4 mg at 07/06/21 1806  •  oxyCODONE (ROXICODONE) immediate release tablet 10 mg, 10 mg, Oral, Q4H PRN, Rox Escalera MD  •  oxyCODONE (ROXICODONE) immediate release tablet 5 mg, 5 mg, Oral, Q4H PRN, Rox Escalera MD, 5 mg at 07/06/21 1440  •  promethazine (PHENERGAN) suppository 12.5 mg, 12.5 mg, Rectal, Q6H PRN **OR** promethazine (PHENERGAN) tablet 12.5 mg, 12.5 mg, Oral, Q6H PRN, Lali,  Rox CALLAHAN MD     Results:   Lab Results   Component Value Date    WBC 14.46 (H) 07/07/2021    HGB 11.7 (L) 07/07/2021    HCT 37.0 07/07/2021    MCV 90.5 07/07/2021     07/07/2021       Lab Results   Component Value Date    GLUCOSE 97 07/07/2021    CALCIUM 9.0 07/07/2021     07/07/2021    K 4.0 07/07/2021    CO2 26.0 07/07/2021     07/07/2021    BUN 12 07/07/2021    CREATININE 0.68 07/07/2021    EGFRIFNONA 89 07/07/2021    BCR 17.6 07/07/2021    ANIONGAP 8.0 07/07/2021        Assessment / Plan    This is a 56 y.o. female who is POD#1 from an exploratory laparotomy, total abdominal hysterectomy, and bilateral salpingo-oophorectomy for a pelvic mass.    1.Post operative care   - Pain: s/p TAP block in OR; ERAS medications ordered - toradol->ibuprofen, tylenol, gabapentin, and oxycodone PRN   - FEN: IV fluids at 75, regular diet ordered   - GI: bowel regimen and antiemetics ordered   - : adequate UOP, voiding spontaneously   - Heme: preop hgb 13.5, EBL 100cc, postop hgb shayna 11.7; ppx heparin ordered   - Gen: encourage ambulation, IS use   - Tubes/lines/drains: none   - Code status: full   - Intraop path: benign fibrothecoma; f/u final pathology    2. Medical co-morbidities: None, healthy    3. Dispo: Anticipate discharge this afternoon vs tomorrow pending return of bowel function    Jana Ocampo MD  07/07/21  07:58 EDT

## 2021-07-07 NOTE — CASE MANAGEMENT/SOCIAL WORK
Case Management Discharge Note      Final Note: Pt lives in [place], [place] with her spouse.  She was independent prior to admission and didn't require home health or DME.  She denies needing any services or resources at discharge.  Her spouse is present and supportive and can provide transportation at discharge.  Pt denies having any discharge needs.         Selected Continued Care - Admitted Since 7/6/2021     Destination    No services have been selected for the patient.              Durable Medical Equipment    No services have been selected for the patient.              Dialysis/Infusion    No services have been selected for the patient.              Home Medical Care    No services have been selected for the patient.              Therapy    No services have been selected for the patient.              Community Resources    No services have been selected for the patient.              Community & DME    No services have been selected for the patient.                  Transportation Services  Private: Car    Final Discharge Disposition Code: 01 - home or self-care

## 2021-07-09 ENCOUNTER — TELEPHONE (OUTPATIENT)
Dept: GYNECOLOGIC ONCOLOGY | Facility: CLINIC | Age: 56
End: 2021-07-09

## 2021-07-09 LAB
CYTO UR: NORMAL
LAB AP CASE REPORT: NORMAL
LAB AP CLINICAL INFORMATION: NORMAL
LAB AP SPECIAL STAINS: NORMAL
Lab: NORMAL
PATH REPORT.FINAL DX SPEC: NORMAL
PATH REPORT.GROSS SPEC: NORMAL

## 2021-07-09 NOTE — TELEPHONE ENCOUNTER
----- Message from Rox Escalera MD sent at 7/9/2021  4:03 PM EDT -----  Please let Ms. Bettencourt know that her pathology confirms benign ovarian mass. She did have an incidental precancer of the cervix found despite her recent normal pap test. She does not need any additional treatment for this but will need to continue paps with her regular gynecologist for the next 20 years.  ----- Message -----  From: Lab, Background User  Sent: 7/9/2021   1:04 PM EDT  To: Rox Escalera MD

## 2021-07-12 ENCOUNTER — TELEPHONE (OUTPATIENT)
Dept: GYNECOLOGIC ONCOLOGY | Facility: CLINIC | Age: 56
End: 2021-07-12

## 2021-07-12 ENCOUNTER — NURSE TRIAGE (OUTPATIENT)
Dept: CALL CENTER | Facility: HOSPITAL | Age: 56
End: 2021-07-12

## 2021-07-12 NOTE — TELEPHONE ENCOUNTER
Mr. Bettencourt called stating that his wife Maryann Bettencourt has not had a bowel movement since 7/5. She had surgery on 7/6 and they want to know what else they should try.  They stated they have been taking the stool softeners and have tried prune juice and ducolax suppositories.  Mr. Bettencourt stated that pt is passing gas, has not been vomiting and is eating and drinking mostly normally.  He stated they were told to try an enema but that pt is in a lot of pain and can't get into that position.  RN told him to try mag citrate and miralax and that RN would talk to Dr. Escalera to see if she had anything additional.  RN also stated that if pt stopped passing gas, started vomiting or having intractable pain that she should go to the ER.    RN spoke with Dr. Escalera who agreed with RN's assessment and plan.     RN called and spoke with Mr. Bettencourt again and then also spoke with the pt.  Pt stated that she was not having a lot of pain other than just needing to go to the bathroom and asked if she could stop taking her oxycodone.  RN stated that yes she of course could switch to ibuprofen and try the measures spoken about with her .  Pt stated that she is going to wear a depends because when she is moving around she feels the urge to go.  RN stated that continuing to move around, drink clear liquids and try the miralax/mag citrate would be beneficial and reiterated times to go to the ER.  Mr. Bettencourt and pt v/u.

## 2021-07-12 NOTE — TELEPHONE ENCOUNTER
"States she had surgery last Tuesday. States they called earlier this morning.     States she has taken stool softeners BID since surgery. Has had laxative on Saturday and Sunday (vegatable laxative). States did a dulcolax suppository an hour ago without any results.     Encouraged to call surgeon to discuss if magnesium citrate would be appropriate or enema since no results with others. Discussed starting miralax daily while on pain meds as she already had a history of constipation, suggested asking surgeon about this as well.  is agreeable.    Reason for Disposition  • Last bowel movement (BM) > 4 days ago    Additional Information  • Negative: [1] Abdomen pain is main symptom AND [2] male  • Negative: [1] Abdomen pain is main symptom AND [2] adult female  • Negative: Rectal bleeding or blood in stool is main symptom  • Negative: Rectal pain or itching is main symptom  • Negative: Constipation in a cancer patient who is currently (or recently) receiving chemotherapy or radiation therapy, or cancer patient who has metastatic or end-stage cancer and is receiving palliative care  • Negative: Patient sounds very sick or weak to the triager  • Negative: [1] Vomiting AND [2] abdomen looks much more swollen than usual  • Negative: [1] Vomiting AND [2] contains bile (green color)  • Negative: [1] Constant abdominal pain AND [2] present > 2 hours  • Negative: [1] Rectal pain or fullness from fecal impaction (rectum full of stool) AND [2] NOT better after SITZ bath, suppository or enema  • Negative: [1] Intermittent mild abdominal pain AND [2] fever  • Negative: Abdomen is more swollen than usual    Answer Assessment - Initial Assessment Questions  1. STOOL PATTERN OR FREQUENCY: \"How often do you pass bowel movements (BMs)?\"  (Normal range: tid to q 3 days)  \"When was the last BM passed?\"        See note  2. STRAINING: \"Do you have to strain to have a BM?\"       See note  3. RECTAL PAIN: \"Does your rectum hurt when " "the stool comes out?\" If Yes, ask: \"Do you have hemorrhoids? How bad is the pain?\"  (Scale 1-10; or mild, moderate, severe)      See not  4. STOOL COMPOSITION: \"Are the stools hard?\"       See note  5. BLOOD ON STOOLS: \"Has there been any blood on the toilet tissue or on the surface of the BM?\" If Yes, ask: \"When was the last time?\"       See note  6. CHRONIC CONSTIPATION: \"Is this a new problem for you?\"  If no, ask: \"How long have you had this problem?\" (days, weeks, months)       Yes but now on pain meds  7. CHANGES IN DIET OR HYDRATION: \"Have there been any recent changes in your diet?\" \"How much fluids are you drinking consuming on a daily basis?\"  \"How much have you had to drink today?\"      See note  8. MEDICATIONS: \"Have you been taking any new medications?\" \"Are you taking any narcotic pain medications?\" (e.g., Vicoden, Percocet, morphine, dilaudid)      n/a  9. LAXATIVES: \"Have you been using any stool softeners, laxatives, or enemas?\"  If yes, ask \"What, how often, and when was the last time?\"  10.ACTIVITY:  \"How much walking do you do every day? on a daily basis?\"  \"Has your activity level decreased in the past week?\"         n/a  11. CAUSE: \"What do you think is causing the constipation?\"         n/a  12. OTHER SYMPTOMS: \"Do you have any other symptoms?\" (e.g., abdominal pain, bloating, fever, vomiting)        n/a  13. MEDICAL HISTORY: \"Do you have a history of hemorrhoids, rectal fissures, or rectal surgery or rectal abscess?\"          n/a  14. PREGNANCY: \"Is there any chance you are pregnant?\" \"When was your last menstrual period?\"        n/a    Protocols used: CONSTIPATION-ADULT-AH      "

## 2021-07-12 NOTE — TELEPHONE ENCOUNTER
Reason for Disposition  • MILD constipation    Additional Information  • Negative: [1] Abdomen pain is main symptom AND [2] male  • Negative: [1] Abdomen pain is main symptom AND [2] adult female  • Negative: Rectal bleeding or blood in stool is main symptom  • Negative: Rectal pain or itching is main symptom  • Negative: Constipation in a cancer patient who is currently (or recently) receiving chemotherapy or radiation therapy, or cancer patient who has metastatic or end-stage cancer and is receiving palliative care  • Negative: Patient sounds very sick or weak to the triager  • Negative: [1] Vomiting AND [2] abdomen looks much more swollen than usual  • Negative: [1] Vomiting AND [2] contains bile (green color)  • Negative: [1] Constant abdominal pain AND [2] present > 2 hours  • Negative: [1] Rectal pain or fullness from fecal impaction (rectum full of stool) AND [2] NOT better after SITZ bath, suppository or enema  • Negative: [1] Intermittent mild abdominal pain AND [2] fever  • Negative: Abdomen is more swollen than usual  • Negative: Last bowel movement (BM) > 4 days ago  • Negative: Leaking stool  • Negative: Unable to have a bowel movement (BM) without manually removing stool (using finger to pull out stool or perform disimpaction)  • Negative: Unable to have a bowel movement (BM) without laxative or enema  • Negative: [1] Constipation persists > 1 week AND [2] no improvement after using CARE ADVICE  • Negative: [1] Weight loss > 10 pounds (5 kg) AND [2] not dieting  • Negative: Pencil-like, narrow stools  • Negative: Taking new prescription medication  • Negative: [1] Minor bleeding from rectum (e.g., blood just on toilet paper, few drops, streaks on surface of normal formed BM) AND [2] 3 or more times  • Negative: [1] Uses laxative (e.g., PEG / Miralax. Milk of magnesia) or enema AND [2] > once a month  • Negative: Constipation is a chronic symptom (recurrent or ongoing AND present > 4 weeks)  •  "Negative: Over-The-Counter (OTC) medicines for constipation, questions about  • Negative: Rectal pain  • Negative: [1] Rectal pain or fullness from fecal impaction (rectum full of stool) AND [2] has not tried SITZ bath, suppository or enema  • Negative: [1] Minor bleeding from rectum (e.g., blood just on toilet paper, few drops, streaks on surface of normal formed BM) AND [2] only 1 or 2 times    Answer Assessment - Initial Assessment Questions  1. STOOL PATTERN OR FREQUENCY: \"How often do you pass bowel movements (BMs)?\"  (Normal range: tid to q 3 days)  \"When was the last BM passed?\"        Daily; 7/6/21  2. STRAINING: \"Do you have to strain to have a BM?\"       Not usually  3. RECTAL PAIN: \"Does your rectum hurt when the stool comes out?\" If Yes, ask: \"Do you have hemorrhoids? How bad is the pain?\"  (Scale 1-10; or mild, moderate, severe)      Not usually  4. STOOL COMPOSITION: \"Are the stools hard?\"       Not usually  5. BLOOD ON STOOLS: \"Has there been any blood on the toilet tissue or on the surface of the BM?\" If Yes, ask: \"When was the last time?\"       na  6. CHRONIC CONSTIPATION: \"Is this a new problem for you?\"  If no, ask: \"How long have you had this problem?\" (days, weeks, months)       no  7. CHANGES IN DIET OR HYDRATION: \"Have there been any recent changes in your diet?\" \"How much fluids are you drinking consuming on a daily basis?\"  \"How much have you had to drink today?\"      Hysterectomy last week  8. MEDICATIONS: \"Have you been taking any new medications?\" \"Are you taking any narcotic pain medications?\" (e.g., Vicoden, Percocet, morphine, dilaudid)      Pain medication  9. LAXATIVES: \"Have you been using any stool softeners, laxatives, or enemas?\"  If yes, ask \"What, how often, and when was the last time?\"  10.ACTIVITY:  \"How much walking do you do every day? on a daily basis?\"  \"Has your activity level decreased in the past week?\"         Stool softeners  11. CAUSE: \"What do you think is causing " "the constipation?\"         Surgery last week  12. OTHER SYMPTOMS: \"Do you have any other symptoms?\" (e.g., abdominal pain, bloating, fever, vomiting)        denies  13. MEDICAL HISTORY: \"Do you have a history of hemorrhoids, rectal fissures, or rectal surgery or rectal abscess?\"          denies  14. PREGNANCY: \"Is there any chance you are pregnant?\" \"When was your last menstrual period?\"        na    Protocols used: CONSTIPATION-ADULT-AH      "

## 2021-07-29 ENCOUNTER — OFFICE VISIT (OUTPATIENT)
Dept: GYNECOLOGIC ONCOLOGY | Facility: CLINIC | Age: 56
End: 2021-07-29

## 2021-07-29 VITALS
HEART RATE: 71 BPM | WEIGHT: 121.1 LBS | HEIGHT: 62 IN | SYSTOLIC BLOOD PRESSURE: 132 MMHG | BODY MASS INDEX: 22.28 KG/M2 | DIASTOLIC BLOOD PRESSURE: 72 MMHG | TEMPERATURE: 98 F | RESPIRATION RATE: 17 BRPM

## 2021-07-29 DIAGNOSIS — Z09 POSTOP CHECK: Primary | ICD-10-CM

## 2021-07-29 DIAGNOSIS — R19.00 PELVIC MASS: ICD-10-CM

## 2021-07-29 PROCEDURE — 99024 POSTOP FOLLOW-UP VISIT: CPT | Performed by: OBSTETRICS & GYNECOLOGY

## 2021-07-30 NOTE — PROGRESS NOTES
"     Post Operative Office Visit      Patient Name: Maryann Bettencourt  : 1965   MRN: [id number]     Chief Complaint:  Postoperative visit    History of Present Illness: Maryann Bettencourt is a 56 y.o. female who is status post total abdominal hysterectomy and bilateral salpingo-oophorectomy on 2021 for pelvic mass. Her post operative course has been unremarkable and continues to be recovering well. She is tolerating a normal diet. She reports normal return of bowel function. She states her pain is well controlled. She is slowly incorporating her normal daily activities.    Medical, surgical, and family history updated as needed.  Subjective      Review of Systems:   Review of Systems   Constitutional: Positive for fatigue. Negative for activity change and fever.   Respiratory: Negative for shortness of breath.    Cardiovascular: Negative for chest pain and leg swelling.   Gastrointestinal: Positive for abdominal pain. Negative for constipation, diarrhea, nausea and vomiting.   Genitourinary: Negative for difficulty urinating, vaginal bleeding and vaginal discharge.   Neurological: Negative for dizziness and light-headedness.        Objective     Physical Exam:  Vital Signs:   Vitals:    21 1334   BP: 132/72   Pulse: 71   Resp: 17   Temp: 98 °F (36.7 °C)   TempSrc: Temporal   Weight: 54.9 kg (121 lb 1.6 oz)   Height: 157.5 cm (62.01\")   PainSc:   1     BMI: Body mass index is 22.14 kg/m².     ECOG performance status 1    Physical Exam  Vitals and nursing note reviewed. Exam conducted with a chaperone present.   Constitutional:       General: She is not in acute distress.     Appearance: Normal appearance. She is well-developed. She is not diaphoretic.   HENT:      Head: Normocephalic and atraumatic.      Right Ear: External ear normal.      Left Ear: External ear normal.      Nose: Nose normal.   Eyes:      General: No scleral icterus.        Right eye: No discharge.         Left eye: No " discharge.      Conjunctiva/sclera: Conjunctivae normal.   Neck:      Thyroid: No thyromegaly.   Cardiovascular:      Rate and Rhythm: Normal rate.   Pulmonary:      Effort: Pulmonary effort is normal. No respiratory distress.   Abdominal:      General: There is no distension.      Palpations: Abdomen is soft. There is no mass.      Tenderness: There is no abdominal tenderness. There is no guarding.      Comments: Well healing vertical midline incision   Genitourinary:     Comments: External genitalia normal. Vagina without discharge. Vaginal cuff intact. Uterus, cervix and adnexa surgically absent. Rectovaginal exam deferred.  Musculoskeletal:         General: No swelling, tenderness, deformity or signs of injury.      Cervical back: Neck supple.      Right lower leg: No edema.      Left lower leg: No edema.   Lymphadenopathy:      Cervical: No cervical adenopathy.   Skin:     General: Skin is warm and dry.      Coloration: Skin is not jaundiced.      Findings: No erythema, lesion or rash.   Neurological:      General: No focal deficit present.      Mental Status: She is alert and oriented to person, place, and time. Mental status is at baseline.      Motor: No abnormal muscle tone.   Psychiatric:         Behavior: Behavior normal.         Thought Content: Thought content normal.         Medications:     Current Outpatient Medications:   •  Loratadine (CLARITIN PO), Take 1 tablet by mouth Daily As Needed., Disp: , Rfl:   Current outpatient and discharge medications have been reconciled for the patient.  Reviewed by: Rox Escalera MD      Allergies:   No Known Allergies    Pathology:   1.  RIGHT OVARY AND FALLOPIAN TUBE, SALPINGO-OOPHORECTOMY:               Benign fibrothecoma               Fallopian tube with no significant histopathologic abnormality     2.  UTERUS, CERVIX, LEFT OVARY AND FALLOPIAN TUBE; HYSTERECTOMY AND LEFT            SALPINGO-OOPHORECTOMY:               Cervix: High-grade squamous  intraepithelial neoplasia (MICHELLE 2-3); the surgical margins are free of  carcinoma or high-grade dysplasia  Endometrium: Proliferative phase endometrium  Myometrium: Adenomyosis, leiomyomata  Left ovary and Fallopian tube: No significant histopathologic abnormality    Operative findings again reviewed. Pathology report discussed.       Assessment / Plan    Maryann Bettencourt is s/p  total abdominal hysterectomy and bilateral salpingo-oophorectomy on 7/6/2021 for pelvic mass and is recovering well from her surgery. 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 Of note, an incidentally identified high-grade squamous intraepithelial neoplasia of the cervix was identified which was surprising given her recent normal Pap test.  Regardless she will need continued screening for vaginal dysplasia for the next 25 years per the ASCCP guidelines.  This can be done with her primary OB/GYN.    She should continue recommended cancer screening guidelines, assessment for osteoporosis, including the use of Vitamin D and calcium, and maintain a healthy lifestyle. She may continue her annual examinations/follow-up locally; however, we will be readily available if needed, particularly during the post-operative course.    Assessment/Plan:   Diagnoses and all orders for this visit:    1. Postop check (Primary)    2. Pelvic mass         Follow Up:   As needed    RASHID Escalera MD  Gynecologic Oncology     Please note that portions of this note may have been completed with a voice recognition program.

## 2021-09-08 NOTE — TELEPHONE ENCOUNTER
1330 went over discharge paper work with Kathy Knott's daughter.  Pt has transportation set for  at 1430 to take pt to [facility].  Pt belongings all  will go with her to TCU.     I called patient and let her know results per Dr. Escalera.     Patient verbalized understanding.       She states she has not had a bowel movement since her surgery.   She denies abdominal pain, distention, n/v or fever.     She is passing gas. She is not eating much d/t the medications she is taking. Advised patient to continue stool softeners. If she does not produce a bowel movement she may try a laxative.    She also was advised to try and eat small portions and drink plenty of fluids.    She will call if she needs anything else.

## 2024-04-09 ENCOUNTER — OFFICE VISIT (OUTPATIENT)
Dept: CARDIOLOGY | Facility: CLINIC | Age: 59
End: 2024-04-09
Payer: COMMERCIAL

## 2024-04-09 VITALS
OXYGEN SATURATION: 98 % | DIASTOLIC BLOOD PRESSURE: 82 MMHG | BODY MASS INDEX: 25.71 KG/M2 | SYSTOLIC BLOOD PRESSURE: 132 MMHG | HEART RATE: 83 BPM | HEIGHT: 62 IN | WEIGHT: 139.7 LBS

## 2024-04-09 DIAGNOSIS — R94.31 ABNORMAL EKG: ICD-10-CM

## 2024-04-09 DIAGNOSIS — R06.83 SNORING: ICD-10-CM

## 2024-04-09 DIAGNOSIS — G47.10 HYPERSOMNIA: ICD-10-CM

## 2024-04-09 DIAGNOSIS — R07.2 PRECORDIAL CHEST PAIN: Primary | ICD-10-CM

## 2024-04-09 PROCEDURE — 99204 OFFICE O/P NEW MOD 45 MIN: CPT | Performed by: NURSE PRACTITIONER

## 2024-04-09 PROCEDURE — 93000 ELECTROCARDIOGRAM COMPLETE: CPT | Performed by: NURSE PRACTITIONER

## 2024-04-09 NOTE — ASSESSMENT & PLAN NOTE
Intermittent mid sternal chest pain that radiates into back. EKG showed t wave abnormality, not previously noted on EKG from 2021.     -Stress echo for further evaluation

## 2024-04-09 NOTE — ASSESSMENT & PLAN NOTE
She reports loud snoring, daytime fatigue and wakes up several times per night coughing. She also wakes up with a dry mouth, sore throat and headaches. She falls asleep easily, especially when trying to read a book or watch tv during the day. Neck size is 13 inches, Elizabeth sleepiness score is 13.     -HST for further evaluation

## 2024-04-09 NOTE — PROGRESS NOTES
[facility] Provider Note     Date:   2024   Name: Maryann Bettencourt  :   1965  PCP: Nemesio Cid MD    Chief Complaint   Patient presents with   • Establish Care     Cardiac Eval- Patient states about 3-4 weeks ago she was having some sharp pains in the center of her chest that hurt when she took a deep breath in and radiated to her back. Patient states that she saw PCP, who recommended she see us for cardiac eval. Patient states that she took some Gas-X over the counter, but it did not help. Patient states pain has since subsided. Neck 13 in circum.       Subjective     History of Present Illness  Maryann Bettencourt is a 59 y.o. female who presents today for new patient evaluation for chest pain.  Patient reports that approximately 3-4 weeks ago she started experiencing midsternal chest pain that radiated into her back. The pain occurred on and off for approximately 2 weeks and not usually related to exertion. She hasn't noticed the discomfort in the last week. She denies shortness of breath, palpitations, lower extremity edema, dizziness or syncope. She tried antacid medications without relief of symptoms. EKG today shows sinus rhythm, HR 74bpm with nonspecific t wave abnormality and short QT interval. When compared to EKG from , she now has a T wave abnormality. She denies previous cardiac disease. She has a family history of CAD, her father had a stroke and previous CABG in his 60s.     Patient also suspects that she may have sleep apnea.  She reports loud snoring, daytime fatigue and wakes up several times per night coughing. She also wakes up with a dry mouth, sore throat and headaches. She falls asleep easily, especially when trying to read a book or watch tv during the day. Neck size is 13 inches, L[place]ue sleepiness score is 13.       No specialty comments available.     Reports Denies   Chest Pain [x] []   Shortness of Air [] [x]   Palpitations []  "[x]   Edema [] [x]   Dizziness [] [x]   Syncope [] [x]       No Known Allergies  No current outpatient medications on file.    Past Medical History:   Diagnosis Date   • Pelvic mass    • Wears glasses       Past Surgical History:   Procedure Laterality Date   • ENDOMETRIAL ABLATION     • EXPLORATORY LAPAROTOMY, TOTAL ABDOMINAL HYSTERECTOMY SALPINGO OOPHORECTOMY N/A 07/06/2021    Procedure: EXPLORATORY LAPAROTOMY, TOTAL ABDOMINAL HYSTERECTOMY, BILATERAL SALPINGO OOPHORECTOMY;  Surgeon: Rox Escalera MD;  Location: [facility];  Service: Gynecology Oncology;  Laterality: N/A;   • HYSTERECTOMY       Family History   Problem Relation Age of Onset   • Lung cancer Mother    • Brain cancer Mother    • Coronary artery disease Father    • Kidney cancer Father    • Diabetes Father    • Hypertension Father    • Stroke Father    • Hypertension Sister    • Arthritis Paternal Grandmother    • Breast cancer Neg Hx    • Ovarian cancer Neg Hx    • Uterine cancer Neg Hx    • Colon cancer Neg Hx    • Melanoma Neg Hx    • Prostate cancer Neg Hx      Social History     Socioeconomic History   • Marital status:    Tobacco Use   • Smoking status: Former     Current packs/day: 0.00     Types: Cigarettes     Start date: 1979     Quit date: 2009     Years since quitting: 15.2     Passive exposure: Past   • Smokeless tobacco: Never   Vaping Use   • Vaping status: Never Used   Substance and Sexual Activity   • Alcohol use: Yes     Alcohol/week: 1.0 standard drink of alcohol     Types: 1 Drinks containing 0.5 oz of alcohol per week   • Drug use: Yes     Types: Marijuana   • Sexual activity: Yes     Partners: Male     Birth control/protection: Post-menopausal       Objective     Vital Signs:  /82 (BP Location: Left arm, Patient Position: Sitting, Cuff Size: Adult)   Pulse 83   Ht 157.5 cm (62\")   Wt 63.4 kg (139 lb 11.2 oz)   SpO2 98%   BMI 25.55 kg/m²   Estimated body mass index is 25.55 kg/m² as calculated from the " "following:    Height as of this encounter: 157.5 cm (62\").    Weight as of this encounter: 63.4 kg (139 lb 11.2 oz).             Physical Exam  Vitals reviewed.   Constitutional:       Appearance: Normal appearance.   HENT:      Head: Normocephalic.   Cardiovascular:      Rate and Rhythm: Normal rate and regular rhythm.      Heart sounds: Normal heart sounds.   Pulmonary:      Effort: Pulmonary effort is normal.      Breath sounds: Normal breath sounds.   Musculoskeletal:      Right lower leg: No edema.      Left lower leg: No edema.   Skin:     General: Skin is warm and dry.      Capillary Refill: Capillary refill takes less than 2 seconds.   Neurological:      General: No focal deficit present.      Mental Status: She is alert and oriented to person, place, and time.   Psychiatric:         Mood and Affect: Mood normal.         Behavior: Behavior normal.                 ECG 12 Lead    Date/Time: 4/9/2024 10:10 AM  Performed by: Silvina Smith APRN    Authorized by: Silvina Smith APRN  Comparison: not compared with previous ECG   Previous ECG: no previous ECG available  Rhythm: sinus rhythm  Rate: normal  BPM: 74  QRS axis: normal  Other findings: T wave abnormality  Other findings comments: Short QT interval    Clinical impression: abnormal EKG           Assessment and Plan     Diagnoses and all orders for this visit:    1. Precordial chest pain (Primary)  Assessment & Plan:  Intermittent mid sternal chest pain that radiates into back. EKG showed t wave abnormality, not previously noted on EKG from 2021.     -Stress echo for further evaluation    Orders:  -     Adult Transthoracic Echo Complete W/ Cont if Necessary Per Protocol; Future  -     Adult Stress Echo W/ Cont or Stress Agent if Necessary Per Protocol; Future    2. Abnormal EKG  Assessment & Plan:   EKG today shows sinus rhythm, HR 74bpm with nonspecific t wave abnormality and short QT interval. When compared to EKG from 2021, she now has a T " wave abnormality.     -Cardiac workup to rule out ischemia.    Orders:  -     Adult Transthoracic Echo Complete W/ Cont if Necessary Per Protocol; Future  -     Adult Stress Echo W/ Cont or Stress Agent if Necessary Per Protocol; Future    3. Hypersomnia  Assessment & Plan:  She reports loud snoring, daytime fatigue and wakes up several times per night coughing. She also wakes up with a dry mouth, sore throat and headaches. She falls asleep easily, especially when trying to read a book or watch tv during the day. Neck size is 13 inches, Goode sleepiness score is 13.     -HST for further evaluation    Orders:  -     Home Sleep Study; Future    4. Snoring  -     Home Sleep Study; Future    Other orders  -     ECG 12 Lead        Recommendations: ER if symptoms increase and Report if any new/changing symptoms immediately          Follow Up  Return in about 4 weeks (around 5/7/2024) for cardiac testing results.  Patient was given instructions and counseling regarding her condition or for health maintenance advice. Please see specific information pulled into the AVS if appropriate.

## 2024-04-09 NOTE — ASSESSMENT & PLAN NOTE
EKG today shows sinus rhythm, HR 74bpm with nonspecific t wave abnormality and short QT interval. When compared to EKG from 2021, she now has a T wave abnormality.     -Cardiac workup to rule out ischemia.

## 2024-04-11 ENCOUNTER — PATIENT ROUNDING (BHMG ONLY) (OUTPATIENT)
Dept: CARDIOLOGY | Facility: CLINIC | Age: 59
End: 2024-04-11
Payer: COMMERCIAL

## 2024-04-11 NOTE — PROGRESS NOTES
..My name is Hansa Marina and I am the Referral Coordinator for [facility] [place].    I would like to thank you for being a loyal patient. If you do not mind I would like to ask you a few questions about your recent visit with us.  Please feel free to reply if you wish to provide us with feedback on your first visit with our practice.    First, could you tell me what went well with your recent visit?    Secondly, we are always looking for ways to make our patients' experiences even better.  Do you have any recommendations on ways we may improve?    Finally, overall were you satisfied with your first visit to us as a [facility] facility?    In the next few days, you will be receiving a Patient Experience Survey.      Thank you for taking the time to answer a few questions today.  I hope you have a good day.

## 2024-04-23 ENCOUNTER — TELEPHONE (OUTPATIENT)
Dept: CARDIOLOGY | Facility: CLINIC | Age: 59
End: 2024-04-23
Payer: COMMERCIAL

## 2024-04-23 DIAGNOSIS — R06.83 SNORING: ICD-10-CM

## 2024-04-23 DIAGNOSIS — G47.10 HYPERSOMNIA: ICD-10-CM

## 2024-04-23 DIAGNOSIS — G47.33 OBSTRUCTIVE SLEEP APNEA: Primary | ICD-10-CM

## 2024-04-23 NOTE — TELEPHONE ENCOUNTER
Attempted to contact patient about her sleep study results, no answer. Went straight to . I left a VM to call our office back.    HUB ok to relay message above.

## 2024-04-23 NOTE — TELEPHONE ENCOUNTER
Please call patient and let her know that the home sleep study showed mild RANDOLPH with an AHI of 13, supine AHI increased to 33.  Her oxygen saturation was below 89% for 12 minutes with oxygen desaturation to a minimum of 81%.  I recommend CPAP therapy.  If patient is agreeable, I will send an order for PAP therapy.

## 2024-06-05 ENCOUNTER — OFFICE VISIT (OUTPATIENT)
Dept: CARDIOLOGY | Facility: CLINIC | Age: 59
End: 2024-06-05
Payer: COMMERCIAL

## 2024-06-05 VITALS
HEIGHT: 62 IN | DIASTOLIC BLOOD PRESSURE: 66 MMHG | WEIGHT: 141 LBS | HEART RATE: 79 BPM | SYSTOLIC BLOOD PRESSURE: 118 MMHG | BODY MASS INDEX: 25.95 KG/M2 | OXYGEN SATURATION: 98 %

## 2024-06-05 DIAGNOSIS — G47.33 OBSTRUCTIVE SLEEP APNEA: Primary | ICD-10-CM

## 2024-06-05 DIAGNOSIS — R07.2 PRECORDIAL CHEST PAIN: ICD-10-CM

## 2024-06-05 PROCEDURE — 99214 OFFICE O/P EST MOD 30 MIN: CPT | Performed by: NURSE PRACTITIONER

## 2024-06-05 NOTE — ASSESSMENT & PLAN NOTE
Chest pain has resolved. She completed a stress echo on 5/20/2024 that revealed a normal stress echo consistent with a low risk study for myocardial ischemia.  Echocardiogram completed on 4/15/2024 revealed an LVEF of 60%, grade 1 diastolic dysfunction and trivial pericardial effusion.

## 2024-06-05 NOTE — PROGRESS NOTES
Follow-Up Sleep Consult     Date:   2024  Name: Maryann Bettencourt  :   1965  PCP: Nemesio Cid MD    Chief Complaint   Patient presents with    Chest Pain     Stress echo results    Sleep Apnea     31-90 day compliance       Subjective     History of Present Illness  Maryann Bettencourt is a 59 y.o. female who presents today for follow-up on RANDOLPH and recent cardiac testing.  Patient initially presented on 2024 with complaints of chest pain.  Cardiac workup was ordered at that time.  He also reported loud snoring and daytime fatigue.  Home sleep study was completed on 4/15/2024 that revealed mild RANDOLPH with an overall AHI of 13, supine AHI was 33.  Oxygen saturation was below 89% for 11.7 minutes with oxygen desaturation to a minimum of 81%.  PAP therapy was initiated at that time and she is here today for 31-90-day compliance visit.  Patient is doing very well on PAP therapy with good control and compliance.  Download reviewed and interpreted today.  Compliance is 100%, when used >4 hours is 97%.  Average use per night is 7 hours and 50 minutes.  AHI is 2.1.  She is using a nasal mask and doing well with that.  Airflow is comfortable.  She denies excessive daytime sleepiness or fatigue.  She has noticed a significant improvement in daytime fatigue.  She also is sleeping throughout the night now instead of waking up frequently.  She completed a stress echo on 2024 that revealed a normal stress echo consistent with a low risk study for myocardial ischemia.  Echocardiogram completed on 4/15/2024 revealed an LVEF of 60%, grade 1 diastolic dysfunction and trivial pericardial effusion.  Patient reports that chest pain has resolved.  She has not had any recurrent episodes of chest pain since her last office visit.  She reports that she is doing very well with no current cardiac symptoms.    Stress echo 2024-normal stress echo consistent with a low risk study for myocardial  "ischemia.    Echocardiogram 4/15/2024-LVEF 63%.  Grade 1 diastolic dysfunction.  Trivial pericardial effusion    Home sleep study 4/15/2024-mild RANDOLPH with overall AHI of 13, supine AHI was 33.  Oxygen saturation was below 89% for 11.7 minutes with oxygen desaturation to a minimum of 81%    History of RANDOLPH with a baseline AHI of 13.     Current Treatment: AutoCPAP 6-20cm  Current mask used is nasal mask    Device Functioning Well: Yes  Mask Fit Comfortable: Yes  Air Flow Comfortable: Yes  DME Helpful for Supplies: Yes  Sleep is rested: Yes    Device Download:                 The patient's relevant past medical, surgical, family, and social history reviewed and updated in Epic as appropriate.    Past Medical History:   Diagnosis Date    RANDOLPH (obstructive sleep apnea) 05/2024    Pelvic mass     Wears glasses      Past Surgical History:   Procedure Laterality Date    ENDOMETRIAL ABLATION      EXPLORATORY LAPAROTOMY, TOTAL ABDOMINAL HYSTERECTOMY SALPINGO OOPHORECTOMY N/A 07/06/2021    Procedure: EXPLORATORY LAPAROTOMY, TOTAL ABDOMINAL HYSTERECTOMY, BILATERAL SALPINGO OOPHORECTOMY;  Surgeon: Rox Escalera MD;  Location: [facility];  Service: Gynecology Oncology;  Laterality: N/A;    HYSTERECTOMY       OB History    No obstetric history on file.       No Known Allergies  Prior to Admission medications    Not on File     Family History   Problem Relation Age of Onset    Lung cancer Mother     Brain cancer Mother     Coronary artery disease Father     Kidney cancer Father     Diabetes Father     Hypertension Father     Stroke Father     Hypertension Sister     Arthritis Paternal Grandmother     Breast cancer Neg Hx     Ovarian cancer Neg Hx     Uterine cancer Neg Hx     Colon cancer Neg Hx     Melanoma Neg Hx     Prostate cancer Neg Hx        Objective     Vital Signs:  /66   Pulse 79   Ht 157.5 cm (62\")   Wt 64 kg (141 lb)   SpO2 98%   BMI 25.79 kg/m²     BMI is >= 25 and <30. (Overweight) The following options " were offered after discussion;: exercise counseling/recommendations        Physical Exam  Vitals reviewed.   Constitutional:       Appearance: Normal appearance.   HENT:      Head: Normocephalic.   Cardiovascular:      Rate and Rhythm: Normal rate and regular rhythm.      Heart sounds: Normal heart sounds.   Pulmonary:      Effort: Pulmonary effort is normal.      Breath sounds: Normal breath sounds.   Musculoskeletal:      Right lower leg: No edema.      Left lower leg: No edema.   Skin:     General: Skin is warm and dry.      Capillary Refill: Capillary refill takes less than 2 seconds.   Neurological:      General: No focal deficit present.      Mental Status: She is alert and oriented to person, place, and time.   Psychiatric:         Mood and Affect: Mood normal.         Behavior: Behavior normal.             Cardiology studies reviewed: Stress test and echo reviewed and PAP download reviewed: Most recent PAP download reviewed         Assessment and Plan     Diagnoses and all orders for this visit:    1. Obstructive sleep apnea (Primary)  Assessment & Plan:  She completed a home sleep study on 4/15/2024  that revealed mild RANDOLPH with an overall AHI of 13, supine AHI was 33.  Oxygen saturation was below 89% for 11.7 minutes with oxygen desaturation to a minimum of 81%.  PAP therapy was initiated at that time and she is here today for 31-90-day compliance visit.  Patient is doing very well on PAP therapy with good control and compliance.  Download reviewed and interpreted today.  Compliance is 100%, when used >4 hours is 97%.  Average use per night is 7 hours and 50 minutes.  AHI is 2.1.  She has noticed a significant improvement in daytime fatigue.  She also is sleeping throughout the night now instead of waking up frequently.    -Change pressure settings to 6-16 cm  - Follow-up in 6 months    Orders:  -     PAP Therapy    2. Precordial chest pain  Assessment & Plan:  Chest pain has resolved. She completed a  stress echo on 5/20/2024 that revealed a normal stress echo consistent with a low risk study for myocardial ischemia.  Echocardiogram completed on 4/15/2024 revealed an LVEF of 60%, grade 1 diastolic dysfunction and trivial pericardial effusion.           Report if any new/changing symptoms immediately         Follow Up  Return in about 6 months (around 12/5/2024) for RANDOLPH.  Patient was given instructions and counseling regarding her condition or for health maintenance advice. Please see specific information pulled into the AVS if appropriate.

## 2024-06-05 NOTE — ASSESSMENT & PLAN NOTE
She completed a home sleep study on 4/15/2024  that revealed mild RANDOLPH with an overall AHI of 13, supine AHI was 33.  Oxygen saturation was below 89% for 11.7 minutes with oxygen desaturation to a minimum of 81%.  PAP therapy was initiated at that time and she is here today for 31-90-day compliance visit.  Patient is doing very well on PAP therapy with good control and compliance.  Download reviewed and interpreted today.  Compliance is 100%, when used >4 hours is 97%.  Average use per night is 7 hours and 50 minutes.  AHI is 2.1.  She has noticed a significant improvement in daytime fatigue.  She also is sleeping throughout the night now instead of waking up frequently.    -Change pressure settings to 6-16 cm  - Follow-up in 6 months

## 2024-12-16 ENCOUNTER — OFFICE VISIT (OUTPATIENT)
Dept: CARDIOLOGY | Facility: CLINIC | Age: 59
End: 2024-12-16
Payer: COMMERCIAL

## 2024-12-16 VITALS
WEIGHT: 137.3 LBS | OXYGEN SATURATION: 97 % | DIASTOLIC BLOOD PRESSURE: 68 MMHG | HEART RATE: 73 BPM | SYSTOLIC BLOOD PRESSURE: 116 MMHG | BODY MASS INDEX: 25.11 KG/M2

## 2024-12-16 DIAGNOSIS — G47.33 OBSTRUCTIVE SLEEP APNEA: Primary | ICD-10-CM

## 2024-12-16 PROCEDURE — 99213 OFFICE O/P EST LOW 20 MIN: CPT | Performed by: NURSE PRACTITIONER

## 2024-12-16 NOTE — ASSESSMENT & PLAN NOTE
She is doing very well on PAP therapy with excellent control and compliance.  Download reviewed and interpreted today.  Compliance is 100%, average use per night is 6 hours and 29 minutes.  AHI is 1.8.    -Continue PAP therapy at current settings  - Do not drive if sleepy  - Follow-up in 1 year

## 2024-12-16 NOTE — PROGRESS NOTES
Follow-Up Sleep Consult     Date:   2024  Name: Maryann Bettencourt  :   1965  PCP: Nemesio Cid MD    Chief Complaint   Patient presents with    Sleep Apnea       Subjective     History of Present Illness  Maryann Bettencourt is a 59 y.o. female who presents today for follow-up on RANDOLPH.  Patient is doing very well on PAP therapy with excellent control and compliance.  Download reviewed and interpreted today.  Compliance is 100%, average use per night is 6 hours and 29 minutes.  AHI is 1.8.  She is using a nasal mask and doing well with that.  Airflow is comfortable.  She denies excessive daytime sleepiness or fatigue.  She has noticed a significant improvement in fatigue since starting PAP therapy.  She is also able to sleep through the night without waking up frequently.  She denies any new cardiac symptoms at this time.  Overall, she is doing very well today with no new symptoms or concerns at this time.    Stress echo 2024-normal stress echo consistent with a low risk study for myocardial ischemia.    Echocardiogram 4/15/2024-LVEF 63%.  Grade 1 diastolic dysfunction.  Trivial pericardial effusion    Home sleep study 4/15/2024-mild RANDOLPH with overall AHI of 13, supine AHI was 33.  Oxygen saturation was below 89% for 11.7 minutes with oxygen desaturation to a minimum of 81%    History of RANDOLPH with a baseline AHI of 13.   Current Treatment: Auto CPAP 6-16 cm  Current mask used is nasal mask    Device Functioning Well: Yes  Mask Fit Comfortable: Yes  Air Flow Comfortable: Yes  DME Helpful for Supplies: Yes  Sleep is rested: Yes    Device Download:                 The patient's relevant past medical, surgical, family, and social history reviewed and updated in Epic as appropriate.    Past Medical History:   Diagnosis Date    RANDOLPH (obstructive sleep apnea) 2024    Pelvic mass     Wears glasses      Past Surgical History:   Procedure Laterality Date    ENDOMETRIAL ABLATION      EXPLORATORY  LAPAROTOMY, TOTAL ABDOMINAL HYSTERECTOMY SALPINGO OOPHORECTOMY N/A 07/06/2021    Procedure: EXPLORATORY LAPAROTOMY, TOTAL ABDOMINAL HYSTERECTOMY, BILATERAL SALPINGO OOPHORECTOMY;  Surgeon: Rox Escalera MD;  Location: [facility];  Service: Gynecology Oncology;  Laterality: N/A;    HYSTERECTOMY       OB History    No obstetric history on file.       No Known Allergies  Prior to Admission medications    Not on File     Family History   Problem Relation Age of Onset    Lung cancer Mother     Brain cancer Mother     Coronary artery disease Father     Kidney cancer Father     Diabetes Father     Hypertension Father     Stroke Father     Hypertension Sister     Arthritis Paternal Grandmother     Breast cancer Neg Hx     Ovarian cancer Neg Hx     Uterine cancer Neg Hx     Colon cancer Neg Hx     Melanoma Neg Hx     Prostate cancer Neg Hx        Objective     Vital Signs:  /68 (BP Location: Right arm, Patient Position: Sitting, Cuff Size: Adult)   Pulse 73   Wt 62.3 kg (137 lb 4.8 oz)   SpO2 97%   BMI 25.11 kg/m²              Physical Exam  Vitals reviewed.   Constitutional:       Appearance: Normal appearance.   HENT:      Head: Normocephalic.   Cardiovascular:      Rate and Rhythm: Normal rate and regular rhythm.      Heart sounds: Normal heart sounds.   Pulmonary:      Effort: Pulmonary effort is normal.      Breath sounds: Normal breath sounds.   Musculoskeletal:      Right lower leg: No edema.      Left lower leg: No edema.   Skin:     General: Skin is warm and dry.      Capillary Refill: Capillary refill takes less than 2 seconds.   Neurological:      General: No focal deficit present.      Mental Status: She is alert and oriented to person, place, and time.   Psychiatric:         Mood and Affect: Mood normal.         Behavior: Behavior normal.             PAP download reviewed: Most recent PAP download reviewed         Assessment and Plan     Diagnoses and all orders for this visit:    1. Obstructive  sleep apnea (Primary)  Assessment & Plan:  She is doing very well on PAP therapy with excellent control and compliance.  Download reviewed and interpreted today.  Compliance is 100%, average use per night is 6 hours and 29 minutes.  AHI is 1.8.    -Continue PAP therapy at current settings  - Do not drive if sleepy  - Follow-up in 1 year          Report if any new/changing symptoms immediately         Follow Up  Return in about 1 year (around 12/16/2025) for RANDOLPH.  Patient was given instructions and counseling regarding her condition or for health maintenance advice. Please see specific information pulled into the AVS if appropriate.

## 2024-12-17 ENCOUNTER — PATIENT ROUNDING (BHMG ONLY) (OUTPATIENT)
Dept: CARDIOLOGY | Facility: CLINIC | Age: 59
End: 2024-12-17
Payer: COMMERCIAL

## 2024-12-17 NOTE — PROGRESS NOTES
..My name is Vicenta Yoder and I am the Practice Manager for [facility].    I would like to thank you for being a loyal patient. If you do not mind I would like to ask you a few questions about your recent visit with us.  Please feel free to reply if you wish to provide us with feedback on your first visit with our practice.    First, could you tell me what went well with your recent visit?    Secondly, we are always looking for ways to make our patients' experiences even better.  Do you have any recommendations on ways we may improve?    Finally, overall were you satisfied with your first visit to us as a [facility] [place] facility?    In the next few days, you will be receiving a Patient Experience Survey.      Thank you for taking the time to answer a few questions today.  I hope you have a good day.

## 2025-05-14 ENCOUNTER — OFFICE VISIT (OUTPATIENT)
Dept: ORTHOPEDIC SURGERY | Facility: CLINIC | Age: 60
End: 2025-05-14
Payer: COMMERCIAL

## 2025-05-14 VITALS
WEIGHT: 144.2 LBS | DIASTOLIC BLOOD PRESSURE: 70 MMHG | HEIGHT: 62 IN | BODY MASS INDEX: 26.54 KG/M2 | SYSTOLIC BLOOD PRESSURE: 124 MMHG

## 2025-05-14 DIAGNOSIS — M72.2 PLANTAR FASCIITIS: ICD-10-CM

## 2025-05-14 DIAGNOSIS — M25.572 LEFT ANKLE PAIN, UNSPECIFIED CHRONICITY: Primary | ICD-10-CM

## 2025-05-14 PROCEDURE — 99203 OFFICE O/P NEW LOW 30 MIN: CPT | Performed by: ORTHOPAEDIC SURGERY

## 2025-05-14 NOTE — PROGRESS NOTES
NEW PATIENT    Patient: Maryann Bettencourt  : 1965    Primary Care Provider: Nemesio Cid MD    Requesting Provider: As above    Pain of the Left Ankle      History    Chief Complaint: Bilateral heel pain and ankle pain    History of Present Illness: This is an extremely pleasant 60-year-old woman who has had bilateral heel pain since about 2024.  It started on the right.  More recently on the left.  She has had an injection on the right that did not change very much.  She has tried different shoes and over-the-counter inserts.  She has tried massage.  A little bit of stretching.  She has tried ice.  She has some pain in the morning and a lot of pain in the evening on arising from a seated position.  The pain can be 5-8 out of 10.  Sometimes the pain radiates into the ankle.  No ankle instability.  No focal swelling.    The patient does not have a personal or family history of DVT, PE, hypercoagulable state or risk factors for clotting.       No current outpatient medications on file prior to visit.     No current facility-administered medications on file prior to visit.      No Known Allergies   Past Medical History:   Diagnosis Date    RANDOLPH (obstructive sleep apnea) 2024    Pelvic mass     Wears glasses      Past Surgical History:   Procedure Laterality Date    ENDOMETRIAL ABLATION      EXPLORATORY LAPAROTOMY, TOTAL ABDOMINAL HYSTERECTOMY SALPINGO OOPHORECTOMY N/A 2021    Procedure: EXPLORATORY LAPAROTOMY, TOTAL ABDOMINAL HYSTERECTOMY, BILATERAL SALPINGO OOPHORECTOMY;  Surgeon: Rox Escalera MD;  Location: [facility];  Service: Gynecology Oncology;  Laterality: N/A;    HYSTERECTOMY       Family History   Problem Relation Age of Onset    Lung cancer Mother     Brain cancer Mother     Cancer Mother         Ling/brain cancer    Coronary artery disease Father     Kidney cancer Father     Diabetes Father     Hypertension Father     Stroke Father     Hypertension Sister     Arthritis  "Paternal Grandmother     Breast cancer Neg Hx     Ovarian cancer Neg Hx     Uterine cancer Neg Hx     Colon cancer Neg Hx     Melanoma Neg Hx     Prostate cancer Neg Hx       Social History     Socioeconomic History    Marital status:    Tobacco Use    Smoking status: Former     Current packs/day: 0.00     Types: Cigarettes     Start date: 1/1/1979     Quit date: 11/2/2009     Years since quitting: 15.5     Passive exposure: Past    Smokeless tobacco: Never    Tobacco comments:     Quit smokin in 2009   Vaping Use    Vaping status: Never Used   Substance and Sexual Activity    Alcohol use: Yes     Alcohol/week: 2.0 standard drinks of alcohol     Types: 1 Cans of beer, 1 Drinks containing 0.5 oz of alcohol per week     Comment: Maybe 1 drink per week    Drug use: Yes     Types: Marijuana    Sexual activity: Yes     Partners: Male     Birth control/protection: Hysterectomy        Review of Systems   Constitutional: Negative.    HENT: Negative.     Eyes: Negative.    Respiratory: Negative.     Cardiovascular: Negative.    Gastrointestinal: Negative.    Endocrine: Negative.    Genitourinary: Negative.    Musculoskeletal:  Positive for arthralgias.   Skin: Negative.    Allergic/Immunologic: Negative.    Neurological: Negative.    Hematological: Negative.    Psychiatric/Behavioral: Negative.         The following portions of the patient's history were reviewed and updated as appropriate: allergies, current medications, past family history, past medical history, past social history, past surgical history, and problem list.    Physical Exam:   /70   Ht 157.5 cm (62\")   Wt 65.4 kg (144 lb 3.2 oz)   BMI 26.37 kg/m²   GENERAL: Body habitus: normal weight for height    Lower extremity edema: Right: none; Left: none    Varicose veins:  Right: mild; Left: mild    Gait: antalgic with the first few steps     Mental Status:  awake and alert; oriented to person, place, and time    Voice:  clear  SKIN:  Lower " "extremity: Normal    Hair Growth(lower extremity):  Right:normal; Left:  normal  NAILS: Toenails: normal  HEENT: Head: Normocephalic, atraumatic,  without obvious abnormality.  eye: normal external eye, no icterus  ears:normal external ears  nose: normal external nose  PULM:  Repiratory effort normal  CV:  Dorsalis Pedis:  Right: 2+; Left:2+    Posterior Tibial: Right:2+; Left:2+    Capillary Refill:  Brisk  MSK:        Tibia:  Right:  non tender; Left:  non tender      Ankle:  Right: non tender; Left:  non tender      Foot:  Right:   Tender at the origin of the plantar fascia ; Left:   Tender at the origin of the plantar fascia      NEURO: Heel Walking:  Right:  painful; Left:  painful    Toe Walking:  Right:  normal; Left:  normal     Martin-Shilpa 5.07 monofilament test: normal    Lower extremity sensation: intact           Motor Function: all 5/5         Medical Decision Making    Data Review:   ordered and reviewed x-rays today    Assessment and Plan/ Diagnosis/Treatment options:   1. Left ankle pain, unspecified chronicity  She has bilateral plantar fasciitis left currently more symptomatic than the right. Diagnosis: plantar fasciitis:  I explained plantar fasciitis in detail to the patient.  I explained to the bow-string mechanism of the plantar fascia.  I went over the current research of the [employer] with them.  I explained the treatments that were not statistically significant in improving the problem including: injection of steroids, special orthotics, special shoes, surgery, medication, ice, time off work, etc.    I explained the treatments that are statistically significant at improving the problem.  These include stretching/night splinting, casting.    I explained the most important treatment: Stretching and night splinting.  I went over the patient information sheet with them, I showed them the stretches and they were able to reproduce them.  I emphasized the \"toe " "pull\" as the most important stretch.  They need to do the stretches 5 repetitions each, 6-8 times per day.  I explained how to do them at work, at home, before getting out of bed, before getting out of the car, and before getting up from a chair.    We provided them with a night splint.    If they do not improve after 4 months of aggressive stretching and night splinting, the next step will be a short leg fiberglass walking cast worn for 6 weeks.    Preprinted education was provided to the patient.        .      2. Plantar fasciitis  As above            Part of this encounter note is an electronic transcription/translation of spoken language to printed text. The electronic translation of spoken language may permit erroneous, or at times, nonsensical words or phrases to be inadvertently transcribed; Although I have reviewed the note for such errors, some may still exist.    NOTE TO PATIENT: The 21st Century Cures Act makes medical notes like these available to patients in the interest of transparency. However, be advised this is a medical document. It is intended as peer to peer communication. It is written in medical language and may contain abbreviations or verbiage that are unfamiliar. It may appear blunt or direct. Medical documents are intended to carry relevant information, facts as evident, and the clinical opinion of the practitioner.       Alka Jones MD    "

## 2025-05-16 ENCOUNTER — PATIENT ROUNDING (BHMG ONLY) (OUTPATIENT)
Dept: ORTHOPEDIC SURGERY | Facility: CLINIC | Age: 60
End: 2025-05-16
Payer: COMMERCIAL

## 2025-05-16 NOTE — PROGRESS NOTES
May 16, 2025    Hello, may I speak with Maryann Bettencourt?    My name is Jam Hutson      I am  with [facility]  [address]   [place] [id number].    Before we get started may I verify your date of birth? 1965    I am calling to officially welcome you to our practice and ask about your recent visit. Is this a good time to talk? yes    Tell me about your visit with us. What things went well?  stated that she came in early filled out a little bit of paperwork, got called back right on time. Everyone was super nice and she liked [employer]. She felt relieved because she was able to leave with some answers to feel like she can get through her injury.   Patient did state that her name was not in the system correctly but that was fixed.      We're always looking for ways to make our patients' experiences even better. Do you have recommendations on ways we may improve?  no    Overall were you satisfied with your first visit to our practice? yes       I appreciate you taking the time to speak with me today. Is there anything else I can do for you? no      Thank you, and have a great day.

## (undated) DEVICE — GLV SURG SENSICARE PI MIC PF SZ6 LF STRL

## (undated) DEVICE — SUT VICYL PLS CTD ANTIB BR 1 27IN VIL

## (undated) DEVICE — ANTIBACTERIAL UNDYED BRAIDED (POLYGLACTIN 910), SYNTHETIC ABSORBABLE SUTURE: Brand: COATED VICRYL

## (undated) DEVICE — SUT VIC 12X27 D8116 BX/12

## (undated) DEVICE — SUT MONOCRYL PLS ANTIB UND 3/0  PS1 27IN

## (undated) DEVICE — 3M™ STERI-DRAPE™ INSTRUMENT POUCH 1018: Brand: STERI-DRAPE™

## (undated) DEVICE — LEX BASIC NO DRAPE: Brand: MEDLINE INDUSTRIES, INC.

## (undated) DEVICE — DRAPE,UNDERBUTTOCKS,STERILE: Brand: MEDLINE

## (undated) DEVICE — DRAPE, LAVH, STERILE: Brand: MEDLINE

## (undated) DEVICE — UNDERGLV SURG BIOGEL INDICAT PF 61/2 GRN

## (undated) DEVICE — SUT PDS LP 1 TP1 96IN VIO PDP880GA

## (undated) DEVICE — ADHS SKIN PREMIERPRO EXOFIN TOPICAL HI/VISC .5ML